# Patient Record
Sex: FEMALE | Race: WHITE | NOT HISPANIC OR LATINO | Employment: FULL TIME | URBAN - METROPOLITAN AREA
[De-identification: names, ages, dates, MRNs, and addresses within clinical notes are randomized per-mention and may not be internally consistent; named-entity substitution may affect disease eponyms.]

---

## 2024-01-24 LAB
HEPATITIS B SURFACE ANTIGEN (EXTERNAL): NEGATIVE
HIV1+2 AB SERPL QL IA: NEGATIVE
RUBELLA ANTIBODY IGG (EXTERNAL): NORMAL

## 2024-04-15 ENCOUNTER — HOSPITAL ENCOUNTER (EMERGENCY)
Facility: CLINIC | Age: 29
Discharge: HOME | End: 2024-04-15
Attending: EMERGENCY MEDICINE | Admitting: EMERGENCY MEDICINE
Payer: COMMERCIAL

## 2024-04-15 VITALS
DIASTOLIC BLOOD PRESSURE: 75 MMHG | TEMPERATURE: 97.9 F | RESPIRATION RATE: 20 BRPM | HEART RATE: 104 BPM | SYSTOLIC BLOOD PRESSURE: 136 MMHG | OXYGEN SATURATION: 97 %

## 2024-04-15 DIAGNOSIS — J10.1 INFLUENZA B: ICD-10-CM

## 2024-04-15 LAB
FLUAV RNA SPEC QL NAA+PROBE: NEGATIVE
FLUBV RNA RESP QL NAA+PROBE: POSITIVE
GROUP A STREP BY PCR: NOT DETECTED
RSV RNA SPEC NAA+PROBE: NEGATIVE
SARS-COV-2 RNA RESP QL NAA+PROBE: NEGATIVE

## 2024-04-15 PROCEDURE — 99283 EMERGENCY DEPT VISIT LOW MDM: CPT

## 2024-04-15 PROCEDURE — 87651 STREP A DNA AMP PROBE: CPT | Performed by: EMERGENCY MEDICINE

## 2024-04-15 PROCEDURE — 87637 SARSCOV2&INF A&B&RSV AMP PRB: CPT | Performed by: EMERGENCY MEDICINE

## 2024-04-15 RX ORDER — OSELTAMIVIR PHOSPHATE 75 MG/1
75 CAPSULE ORAL 2 TIMES DAILY
Qty: 10 CAPSULE | Refills: 0 | Status: SHIPPED | OUTPATIENT
Start: 2024-04-15 | End: 2024-04-20

## 2024-04-15 ASSESSMENT — ACTIVITIES OF DAILY LIVING (ADL)
ADLS_ACUITY_SCORE: 33
ADLS_ACUITY_SCORE: 33

## 2024-04-15 ASSESSMENT — COLUMBIA-SUICIDE SEVERITY RATING SCALE - C-SSRS
2. HAVE YOU ACTUALLY HAD ANY THOUGHTS OF KILLING YOURSELF IN THE PAST MONTH?: NO
1. IN THE PAST MONTH, HAVE YOU WISHED YOU WERE DEAD OR WISHED YOU COULD GO TO SLEEP AND NOT WAKE UP?: NO
6. HAVE YOU EVER DONE ANYTHING, STARTED TO DO ANYTHING, OR PREPARED TO DO ANYTHING TO END YOUR LIFE?: NO

## 2024-04-15 NOTE — ED TRIAGE NOTES
Sore throat, mild sob, generalized weakness. 6 months pregnant. Low grade fever that has resolved. Denies taking otc antipyretics. Denies any obstetric complaints.

## 2024-04-15 NOTE — DISCHARGE INSTRUCTIONS
Avoid all NSAIDS (naproxen, ibuprofen, aspirin, advil, motrin, aleve, etc.)  Avoid sudafed, phenylephrine, pseudoephedrine, afrin.     Ok to take claritin, flonase, and mucinex (make sure just mucinex) for symptom relief.   Tylenol ok to take 1,000mg every 6 hours as needed.     If ever any question/uncertainty you can ask your pharmacist or call your obgyn clinic.       Discharge Instructions  Influenza    You were diagnosed today with influenza or influenza like illness.  Influenza is a respiratory (breathing) illness caused by influenza A or B viruses.  Influenza causes five primary symptoms: fever, headache, muscle aches/fatigue/malaise, sore throat and cough.  These symptoms start one to four days after you have been around a person with this illness. Influenza is spread through sneezing and coughing and can live on surfaces for several days.  It is usually contagious for 5 days but in some cases up to 10 days and often affects several family members. If you have a family member who is less than 2 years old, older than 65 years old, pregnant or has a serious medical condition, they should be seen right away by a provider to decide if they should take preventative medications. Although influenza will make you feel very ill, most patients don t require any specific treatment. An antiviral medication might be prescribed for certain groups of patients (older patients, younger patients, and those with certain chronic medical problems).    Generally, every Emergency Department visit should have a follow-up clinic visit with either a primary or a specialty clinic/provider. Please follow-up as instructed by your emergency provider today.    Return to the Emergency Department if:  You have trouble breathing.  You develop pain in your chest.  You have signs of being dehydrated, such as dizziness or unable to urinate (pee) at least three times daily.  You are confused or severely weak.  You cannot stop vomiting (throwing  up) or you cannot drink enough fluids.    In children, you should seek help if the child has any of the above or if child:  Has blue or purplish skin color.  Is so irritable that he or she does not want to be held.  Does not have tears when crying (in infants) or does not urinate at least three times daily.  Does not wake up easily.    What can I do to help myself?  Rest.  Fluids -- Drink hydrating solutions such as Gatorade  or Pedialyte  as often as you can. If you are drinking enough, you should pass urine at least every eight hours.  Tylenol  (acetaminophen) and Advil  (ibuprofen) can relieve fever, headache, and muscle aches. Do not give aspirin to children under 18 years old.   Antiviral treatment -- Antiviral medicines do not make the flu symptoms go away immediately.  They have only been shown to make the symptoms go away 12 to 24 hours sooner than they would without treatment.     Antibiotics -- Antibiotics are NOT useful for treating viral illnesses such as influenza. Antibiotics should only be used if there is a bacterial complication of the flu such as bacterial pneumonia, ear infection, or sinusitis.  Because you were diagnosed with a flu like illness you are very contagious.  This means you cannot work, attend school or  for at least 24 hours or until you no longer have a fever.  If you were given a prescription for medicine here today, be sure to read all of the information (including the package insert) that comes with your prescription.  This will include important information about the medicine, its side effects, and any warnings that you need to know about.  The pharmacist who fills the prescription can provide more information and answer questions you may have about the medicine.  If you have questions or concerns that the pharmacist cannot address, please call or return to the Emergency Department.   Remember that you can always come back to the Emergency Department if you are not able to  see your regular provider in the amount of time listed above, if you get any new symptoms, or if there is anything that worries you.

## 2024-04-15 NOTE — LETTER
April 15, 2024      To Whom It May Concern:      Eleanor Blake was seen in our Emergency Department today, 04/15/24.  I expect her condition to improve over the next 5 days.  She may return to work/school when improved, in the meantime she may work from home.     Sincerely,        Catherine OLIVARES

## 2024-04-15 NOTE — ED PROVIDER NOTES
History     Chief Complaint:  Flu Symptoms      HPI   Eleanor Blake is a 28 year old female, , currently 29 weeks pregnant who presents to the ED with flu symptoms. Patient states that she developed hot and cold flashes three days ago. Maximum fever was 100.1 F. She has also been experiencing rhinorrhea, a severe sore throat, and a cough. She expresses concerns that her cough is putting pressure on her baby. She is still able to feel her baby move. Also endorses dizziness. Eleanor adds that her ears have been popping, though they are not painful. She took 1 Naproxen for her symptoms prior to arrival today.     Independent Historian:   None - Patient Only    Review of External Notes:    none    Medications:    Lexapro   Atarax   Vistaril   Prilosec     Past Medical History:    No medical history on record.     Physical Exam   Patient Vitals for the past 24 hrs:   BP Temp Pulse Resp SpO2   04/15/24 1122 136/75 97.9  F (36.6  C) 104 20 97 %        Physical Exam    Nursing note and vitals reviewed.  HENT:   Mouth/Throat: Moist mucous membranes.   TMs normal bilaterally.   Eyes: EOMI, nonicteric sclera  Cardiovascular: Normal rate, regular rhythm, no murmurs, rubs, or gallops  Pulmonary/Chest: Effort normal and breath sounds normal. No respiratory distress. No wheezes. No rales.   Abdominal: Soft. Nontender, nondistended, no guarding or rigidity.   Musculoskeletal: Normal range of motion.   Neurological: Alert. Moves all extremities spontaneously.   Skin: Skin is warm and dry. No rash noted.       Emergency Department Course     Laboratory:  Labs Ordered and Resulted from Time of ED Arrival to Time of ED Departure   INFLUENZA A/B, RSV, & SARS-COV2 PCR - Abnormal       Result Value    Influenza A PCR Negative      Influenza B PCR Positive (*)     RSV PCR Negative      SARS CoV2 PCR Negative     GROUP A STREPTOCOCCUS PCR THROAT SWAB - Normal    Group A strep by PCR Not Detected         Emergency Department Course &  Assessments:    Assessments:  1302 I obtained history and examined the patient as noted above. Also discussed findings and plan for discharge with patient, who is agreeable.     Social Determinants of Health affecting care:   None    Disposition:  The patient was discharged.     Impression & Plan    CMS Diagnoses: None       Medical Decision Making:  Eleanor Blake is a 28 year old female who presents for evaluation of cough, fever and myalgias. She is currently pregnant.   This is consistent with influenza and PCR testing positive. While she is out of typical treatment window, the fact that she is pregnant makes tamiflu indicated. We discussed effectiveness and side effects. They are at risk for pneumonia but no signs of this are detected on today's visit.  Close followup with obgyn is indicated and return to the ED for increasing productive cough, shortness of breath, or confusion.  There is no signs of serious bacterial infection such as bacteremia, meningitis, UTI/pyelonephritis, strep pharyngitis, etc.  FHT were checked and normal. We discussed supportive medications that are safe in pregnancy, and we also discussed medications she should avoid. She is in stable condition at the time of discharge, red flags that should merit ED return were discussed as well as recommended follow-up instructions. All questions were answered and she is in agreement with the plan.          Diagnosis:    ICD-10-CM    1. Influenza B  J10.1            Discharge Medications:  Discharge Medication List as of 4/15/2024  1:12 PM        START taking these medications    Details   oseltamivir (TAMIFLU) 75 MG capsule Take 1 capsule (75 mg) by mouth 2 times daily for 5 days, Disp-10 capsule, R-0, E-Prescribe                Scribe Disclosure:  JARRED, Rose Valle, am serving as a scribe at 1:02 PM on 4/15/2024 to document services personally performed by Kole Root MD based on my observations and the provider's statements to me.           Kole Root MD  04/17/24 7920

## 2024-07-03 LAB — GROUP B STREPTOCOCCUS (EXTERNAL): NEGATIVE

## 2024-07-21 ENCOUNTER — HOSPITAL ENCOUNTER (OUTPATIENT)
Facility: CLINIC | Age: 29
Discharge: HOME | End: 2024-07-21
Attending: OBSTETRICS & GYNECOLOGY | Admitting: OBSTETRICS & GYNECOLOGY
Payer: COMMERCIAL

## 2024-07-21 VITALS
WEIGHT: 224 LBS | SYSTOLIC BLOOD PRESSURE: 106 MMHG | HEIGHT: 65 IN | DIASTOLIC BLOOD PRESSURE: 70 MMHG | TEMPERATURE: 97.9 F | BODY MASS INDEX: 37.32 KG/M2 | RESPIRATION RATE: 18 BRPM

## 2024-07-21 PROBLEM — Z36.89 ENCOUNTER FOR TRIAGE IN PREGNANT PATIENT: Status: ACTIVE | Noted: 2024-07-21

## 2024-07-21 LAB
ALBUMIN UR-MCNC: NEGATIVE MG/DL
AMORPH CRY #/AREA URNS HPF: ABNORMAL /HPF
APPEARANCE UR: ABNORMAL
BILIRUB UR QL STRIP: NEGATIVE
COLOR UR AUTO: YELLOW
GLUCOSE UR STRIP-MCNC: NEGATIVE MG/DL
HGB UR QL STRIP: NEGATIVE
HYALINE CASTS: 1 /LPF
KETONES UR STRIP-MCNC: NEGATIVE MG/DL
LEUKOCYTE ESTERASE UR QL STRIP: NEGATIVE
MUCOUS THREADS #/AREA URNS LPF: PRESENT /LPF
NITRATE UR QL: NEGATIVE
PH UR STRIP: 6.5 [PH] (ref 5–7)
RBC URINE: 1 /HPF
SP GR UR STRIP: 1.02 (ref 1–1.03)
SQUAMOUS EPITHELIAL: <1 /HPF
UROBILINOGEN UR STRIP-MCNC: 2 MG/DL
WBC URINE: 2 /HPF

## 2024-07-21 PROCEDURE — 59025 FETAL NON-STRESS TEST: CPT

## 2024-07-21 PROCEDURE — 81001 URINALYSIS AUTO W/SCOPE: CPT | Performed by: OBSTETRICS & GYNECOLOGY

## 2024-07-21 PROCEDURE — G0463 HOSPITAL OUTPT CLINIC VISIT: HCPCS | Mod: 25

## 2024-07-21 RX ORDER — LIDOCAINE 40 MG/G
CREAM TOPICAL
Status: DISCONTINUED | OUTPATIENT
Start: 2024-07-21 | End: 2024-07-21 | Stop reason: HOSPADM

## 2024-07-21 RX ORDER — ESCITALOPRAM OXALATE 20 MG/1
20 TABLET ORAL DAILY
COMMUNITY

## 2024-07-21 ASSESSMENT — ACTIVITIES OF DAILY LIVING (ADL)
ADLS_ACUITY_SCORE: 20
ADLS_ACUITY_SCORE: 20
CONCENTRATING,_REMEMBERING_OR_MAKING_DECISIONS_DIFFICULTY: NO
WEAR_GLASSES_OR_BLIND: YES
FALL_HISTORY_WITHIN_LAST_SIX_MONTHS: NO

## 2024-07-22 NOTE — DISCHARGE INSTRUCTIONS
Learning About When to Call Your Doctor During Pregnancy (After 20 Weeks)  Overview  It's common to have concerns about what might be a problem when you're pregnant. Most pregnancies don't have any serious problems. But it's still important to know when to call your doctor if you have certain symptoms or signs of labor.  These are general suggestions. Your doctor may give you some more information about when to call.  When to call your doctor (after 20 weeks)  Call 911  anytime you think you may need emergency care. For example, call if:  You have severe vaginal bleeding. This means you are soaking through a pad each hour for 2 or more hours.  You have sudden, severe pain in your belly.  You have chest pain, are short of breath, or cough up blood.  You passed out (lost consciousness).  You have a seizure.  You see or feel the umbilical cord.  You think you are about to deliver your baby and can't make it safely to the hospital or birthing center.  Call your doctor now or seek immediate medical care if:  You have vaginal bleeding.  You have belly pain.  You have a fever.  You are dizzy or lightheaded, or you feel like you may faint.  You have signs of a blood clot in your leg (called a deep vein thrombosis), such as:  Pain in the calf, back of the knee, thigh, or groin.  Swelling in your leg or groin.  A color change on the leg or groin. The skin may be reddish or purplish, depending on your usual skin color.  You have symptoms of preeclampsia, such as:  Sudden swelling of your face, hands, or feet.  New vision problems (such as dimness, blurring, or seeing spots).  A severe headache.  You have a sudden release of fluid from your vagina. (You think your water broke.)  You've been having regular contractions for an hour. This means that you've had at least 6 contractions within 1 hour, even after you change your position and drink fluids.  You notice that your baby has stopped moving or is moving less than  "normal.  You have signs of heart failure, such as:  New or increased shortness of breath.  New or worse swelling in your legs, ankles, or feet.  Sudden weight gain, such as more than 2 to 3 pounds in a day or 5 pounds in a week.  Feeling so tired or weak that you cannot do your usual activities.  You have symptoms of a urinary tract infection. These may include:  Pain or burning when you urinate.  A frequent need to urinate without being able to pass much urine.  Pain in the flank, which is just below the rib cage and above the waist on either side of the back.  Blood in your urine.  Watch closely for changes in your health, and be sure to contact your doctor if:  You have vaginal discharge that smells bad.  You feel sad, anxious, or hopeless for more than a few days.  You have skin changes, such as a rash, itching, or a yellow color to your skin.  You have other concerns about your pregnancy.  If you have labor signs at 37 weeks or more  If you have signs of labor at 37 weeks or more, your doctor may tell you to call when your labor becomes more active. Symptoms of active labor include:  Contractions that are regular.  Contractions that are less than 5 minutes apart.  Contractions that are hard to talk through.  Follow-up care is a key part of your treatment and safety. Be sure to make and go to all appointments, and call your doctor if you are having problems. It's also a good idea to know your test results and keep a list of the medicines you take.  Where can you learn more?  Go to [email or web address].net/patiented  Enter N531 in the search box to learn more about \"Learning About When to Call Your Doctor During Pregnancy (After 20 Weeks).\"  Current as of: July 10, 2023               Content Version: 14.0    6639-4259 [employer].   Care instructions adapted under license by your healthcare professional. If you have questions about a medical condition or this instruction, always ask your healthcare " professional. [employer] disclaims any warranty or liability for your use of this information.

## 2024-07-22 NOTE — PROGRESS NOTES
Data: Patient assessed in the [facility] for uterine contractions, vaginal bleeding.  Cervical exam closed.  Membranes intact.  Contractions/uterine assessment completed.  Action:  Presumed adequate fetal oxygenation documented (see flow record). Discharge instructions reviewed.  Patient instructed to report change in fetal movement, vaginal leaking of fluid or bleeding, abdominal pain, or any concerns related to the pregnancy to her nurse/physician.    Response: Orders to discharge home per Sarmad Uribe.  Patient verbalized understanding of education and verbalized agreement with plan. Discharged to home at 2136.

## 2024-07-22 NOTE — CARE PLAN
Data: Patient presented to [facility]: 2024  7:49 PM.  Reason for maternal/fetal assessment is uterine contractions, vaginal bleeding. Patient reports bleeding occurred around 1900 while at the movie theater she noticed some cramping and then went to the bathroom and noticed some bright red blood on toilet paper. Since then she has felt mildly crampy but has not noticed more blood.  Patient is a .  Prenatal record reviewed. Pregnancy  has been complicated by abnormal nips requiring amniocentesis which came back WDL.  Gestational Age 38w4d. VSS. Fetal movement active. Patient denies pelvic pressure, nausea, vomiting, headache, visual disturbances, epigastric or URQ pain, significant edema. Support person is present.   Action: Verbal consent for EFM. Triage assessment completed. Bill of rights reviewed.  Response: Patient verbalized agreement with plan. Will contact Dr Sarmad Uribe with update and for further orders.

## 2024-07-22 NOTE — PROVIDER NOTIFICATION
"   24   Provider Notification   Provider Name/Title Dr Uribe   Method of Notification Phone   Request Evaluate - Remote   Notification Reason Status Update     Updated MD of pt here for VB and rule out labor. Pt is a  at 38.4 with health hx notable for anxiety and abnormal NIPS with WDL amniocentesis. Pt reports cramping occurring while at the movies earlier this evening and then used the bathroom and noted bright red blood on toilet paper. Since then she has felt mildly crampy and has not noticed more blood. Pt reports that she has felt \"moist\" but that she has noticed increase in discharge. SVE performed and external os 1cm and internal os funnels to close, thick, medium consistency and slightly posterior. No VB noted on glove and no LOF noted on labia,chucks etc after pooling for approx 1hr. Fetal tracing initially minimal to moderate variability with accelerations and unclassifiable decrease around . Fetal tracing now Cat 1. [place] replaced due to showing high resting tone, and now no cx noted. Abdomen soft and non tender to palpation. Orders to charge for NST, and UA- if WDL pt ok to disharge to home with out follow up call to MD.   "

## 2024-07-25 ENCOUNTER — ANESTHESIA (OUTPATIENT)
Dept: OBGYN | Facility: CLINIC | Age: 29
End: 2024-07-25
Payer: COMMERCIAL

## 2024-07-25 ENCOUNTER — ANESTHESIA EVENT (OUTPATIENT)
Dept: OBGYN | Facility: CLINIC | Age: 29
End: 2024-07-25
Payer: COMMERCIAL

## 2024-07-25 ENCOUNTER — HOSPITAL ENCOUNTER (INPATIENT)
Facility: CLINIC | Age: 29
LOS: 2 days | Discharge: HOME HEALTH | End: 2024-07-28
Attending: OBSTETRICS & GYNECOLOGY | Admitting: OBSTETRICS & GYNECOLOGY
Payer: COMMERCIAL

## 2024-07-25 DIAGNOSIS — Z98.891 S/P CESAREAN SECTION: Primary | ICD-10-CM

## 2024-07-25 LAB
ABO/RH(D): NORMAL
ANTIBODY SCREEN: NEGATIVE
ERYTHROCYTE [DISTWIDTH] IN BLOOD BY AUTOMATED COUNT: 14.1 % (ref 10–15)
FLUAV RNA SPEC QL NAA+PROBE: NEGATIVE
FLUBV RNA RESP QL NAA+PROBE: NEGATIVE
HCT VFR BLD AUTO: 37.4 % (ref 35–47)
HGB BLD-MCNC: 12.1 G/DL (ref 11.7–15.7)
MCH RBC QN AUTO: 28.1 PG (ref 26.5–33)
MCHC RBC AUTO-ENTMCNC: 32.4 G/DL (ref 31.5–36.5)
MCV RBC AUTO: 87 FL (ref 78–100)
PLATELET # BLD AUTO: 317 10E3/UL (ref 150–450)
RBC # BLD AUTO: 4.3 10E6/UL (ref 3.8–5.2)
RSV RNA SPEC NAA+PROBE: NEGATIVE
SARS-COV-2 RNA RESP QL NAA+PROBE: NEGATIVE
SPECIMEN EXPIRATION DATE: NORMAL
WBC # BLD AUTO: 14.7 10E3/UL (ref 4–11)

## 2024-07-25 PROCEDURE — G0463 HOSPITAL OUTPT CLINIC VISIT: HCPCS

## 2024-07-25 PROCEDURE — 710N000009 HC RECOVERY PHASE 1, LEVEL 1, PER MIN: Performed by: OBSTETRICS & GYNECOLOGY

## 2024-07-25 PROCEDURE — 370N000017 HC ANESTHESIA TECHNICAL FEE, PER MIN: Performed by: OBSTETRICS & GYNECOLOGY

## 2024-07-25 PROCEDURE — 250N000011 HC RX IP 250 OP 636: Performed by: ANESTHESIOLOGY

## 2024-07-25 PROCEDURE — 258N000003 HC RX IP 258 OP 636: Performed by: OBSTETRICS & GYNECOLOGY

## 2024-07-25 PROCEDURE — 87637 SARSCOV2&INF A&B&RSV AMP PRB: CPT | Performed by: OBSTETRICS & GYNECOLOGY

## 2024-07-25 PROCEDURE — 0W3R0ZZ CONTROL BLEEDING IN GENITOURINARY TRACT, OPEN APPROACH: ICD-10-PCS | Performed by: OBSTETRICS & GYNECOLOGY

## 2024-07-25 PROCEDURE — 250N000009 HC RX 250: Performed by: NURSE ANESTHETIST, CERTIFIED REGISTERED

## 2024-07-25 PROCEDURE — 360N000076 HC SURGERY LEVEL 3, PER MIN: Performed by: OBSTETRICS & GYNECOLOGY

## 2024-07-25 PROCEDURE — 86900 BLOOD TYPING SEROLOGIC ABO: CPT | Performed by: OBSTETRICS & GYNECOLOGY

## 2024-07-25 PROCEDURE — 258N000003 HC RX IP 258 OP 636: Performed by: NURSE ANESTHETIST, CERTIFIED REGISTERED

## 2024-07-25 PROCEDURE — 250N000011 HC RX IP 250 OP 636: Performed by: NURSE ANESTHETIST, CERTIFIED REGISTERED

## 2024-07-25 PROCEDURE — 85027 COMPLETE CBC AUTOMATED: CPT | Performed by: OBSTETRICS & GYNECOLOGY

## 2024-07-25 PROCEDURE — 86780 TREPONEMA PALLIDUM: CPT | Performed by: OBSTETRICS & GYNECOLOGY

## 2024-07-25 PROCEDURE — 250N000013 HC RX MED GY IP 250 OP 250 PS 637: Performed by: OBSTETRICS & GYNECOLOGY

## 2024-07-25 PROCEDURE — 250N000011 HC RX IP 250 OP 636: Performed by: OBSTETRICS & GYNECOLOGY

## 2024-07-25 PROCEDURE — 272N000001 HC OR GENERAL SUPPLY STERILE: Performed by: OBSTETRICS & GYNECOLOGY

## 2024-07-25 RX ORDER — CEFAZOLIN SODIUM/WATER 2 G/20 ML
2 SYRINGE (ML) INTRAVENOUS SEE ADMIN INSTRUCTIONS
Status: DISCONTINUED | OUTPATIENT
Start: 2024-07-25 | End: 2024-07-25 | Stop reason: HOSPADM

## 2024-07-25 RX ORDER — AZITHROMYCIN 500 MG/5ML
INJECTION, POWDER, LYOPHILIZED, FOR SOLUTION INTRAVENOUS
Status: COMPLETED
Start: 2024-07-25 | End: 2024-07-25

## 2024-07-25 RX ORDER — CARBOPROST TROMETHAMINE 250 UG/ML
250 INJECTION, SOLUTION INTRAMUSCULAR
Status: DISCONTINUED | OUTPATIENT
Start: 2024-07-25 | End: 2024-07-25 | Stop reason: HOSPADM

## 2024-07-25 RX ORDER — DEXAMETHASONE SODIUM PHOSPHATE 4 MG/ML
INJECTION, SOLUTION INTRA-ARTICULAR; INTRALESIONAL; INTRAMUSCULAR; INTRAVENOUS; SOFT TISSUE PRN
Status: DISCONTINUED | OUTPATIENT
Start: 2024-07-25 | End: 2024-07-25

## 2024-07-25 RX ORDER — NALBUPHINE HYDROCHLORIDE 10 MG/ML
2.5-5 INJECTION, SOLUTION INTRAMUSCULAR; INTRAVENOUS; SUBCUTANEOUS EVERY 6 HOURS PRN
Status: DISCONTINUED | OUTPATIENT
Start: 2024-07-25 | End: 2024-07-28 | Stop reason: HOSPADM

## 2024-07-25 RX ORDER — OXYTOCIN/0.9 % SODIUM CHLORIDE 30/500 ML
PLASTIC BAG, INJECTION (ML) INTRAVENOUS PRN
Status: DISCONTINUED | OUTPATIENT
Start: 2024-07-25 | End: 2024-07-25

## 2024-07-25 RX ORDER — ACETAMINOPHEN 325 MG/1
975 TABLET ORAL ONCE
Status: COMPLETED | OUTPATIENT
Start: 2024-07-25 | End: 2024-07-25

## 2024-07-25 RX ORDER — KETOROLAC TROMETHAMINE 30 MG/ML
INJECTION, SOLUTION INTRAMUSCULAR; INTRAVENOUS PRN
Status: DISCONTINUED | OUTPATIENT
Start: 2024-07-25 | End: 2024-07-25

## 2024-07-25 RX ORDER — LOPERAMIDE HCL 2 MG
4 CAPSULE ORAL
Status: DISCONTINUED | OUTPATIENT
Start: 2024-07-25 | End: 2024-07-25 | Stop reason: HOSPADM

## 2024-07-25 RX ORDER — SODIUM CHLORIDE, SODIUM LACTATE, POTASSIUM CHLORIDE, CALCIUM CHLORIDE 600; 310; 30; 20 MG/100ML; MG/100ML; MG/100ML; MG/100ML
INJECTION, SOLUTION INTRAVENOUS CONTINUOUS
Status: DISCONTINUED | OUTPATIENT
Start: 2024-07-25 | End: 2024-07-25 | Stop reason: HOSPADM

## 2024-07-25 RX ORDER — METHYLERGONOVINE MALEATE 0.2 MG/ML
200 INJECTION INTRAVENOUS
Status: DISCONTINUED | OUTPATIENT
Start: 2024-07-25 | End: 2024-07-25 | Stop reason: HOSPADM

## 2024-07-25 RX ORDER — FENTANYL CITRATE 50 UG/ML
INJECTION, SOLUTION INTRAMUSCULAR; INTRAVENOUS
Status: COMPLETED | OUTPATIENT
Start: 2024-07-25 | End: 2024-07-25

## 2024-07-25 RX ORDER — OXYTOCIN/0.9 % SODIUM CHLORIDE 30/500 ML
340 PLASTIC BAG, INJECTION (ML) INTRAVENOUS CONTINUOUS PRN
Status: DISCONTINUED | OUTPATIENT
Start: 2024-07-25 | End: 2024-07-25 | Stop reason: HOSPADM

## 2024-07-25 RX ORDER — ASPIRIN 81 MG/1
1 TABLET ORAL DAILY
Status: ON HOLD | COMMUNITY
End: 2024-07-27

## 2024-07-25 RX ORDER — OXYTOCIN 10 [USP'U]/ML
10 INJECTION, SOLUTION INTRAMUSCULAR; INTRAVENOUS
Status: DISCONTINUED | OUTPATIENT
Start: 2024-07-25 | End: 2024-07-25 | Stop reason: HOSPADM

## 2024-07-25 RX ORDER — CEFAZOLIN SODIUM/WATER 2 G/20 ML
SYRINGE (ML) INTRAVENOUS
Status: COMPLETED
Start: 2024-07-25 | End: 2024-07-25

## 2024-07-25 RX ORDER — MORPHINE SULFATE 1 MG/ML
INJECTION, SOLUTION EPIDURAL; INTRATHECAL; INTRAVENOUS
Status: COMPLETED | OUTPATIENT
Start: 2024-07-25 | End: 2024-07-25

## 2024-07-25 RX ORDER — CITRIC ACID/SODIUM CITRATE 334-500MG
30 SOLUTION, ORAL ORAL
Status: COMPLETED | OUTPATIENT
Start: 2024-07-25 | End: 2024-07-25

## 2024-07-25 RX ORDER — CITRIC ACID/SODIUM CITRATE 334-500MG
30 SOLUTION, ORAL ORAL ONCE
Status: DISCONTINUED | OUTPATIENT
Start: 2024-07-25 | End: 2024-07-26 | Stop reason: HOSPADM

## 2024-07-25 RX ORDER — EPHEDRINE SULFATE 50 MG/ML
5 INJECTION, SOLUTION INTRAMUSCULAR; INTRAVENOUS; SUBCUTANEOUS
Status: DISCONTINUED | OUTPATIENT
Start: 2024-07-25 | End: 2024-07-26 | Stop reason: HOSPADM

## 2024-07-25 RX ORDER — BUPIVACAINE HYDROCHLORIDE 7.5 MG/ML
INJECTION, SOLUTION INTRASPINAL
Status: COMPLETED | OUTPATIENT
Start: 2024-07-25 | End: 2024-07-25

## 2024-07-25 RX ORDER — LIDOCAINE 40 MG/G
CREAM TOPICAL
Status: DISCONTINUED | OUTPATIENT
Start: 2024-07-25 | End: 2024-07-25 | Stop reason: HOSPADM

## 2024-07-25 RX ORDER — TRANEXAMIC ACID 10 MG/ML
1 INJECTION, SOLUTION INTRAVENOUS EVERY 30 MIN PRN
Status: DISCONTINUED | OUTPATIENT
Start: 2024-07-25 | End: 2024-07-25 | Stop reason: HOSPADM

## 2024-07-25 RX ORDER — LIDOCAINE 40 MG/G
CREAM TOPICAL
Status: DISCONTINUED | OUTPATIENT
Start: 2024-07-25 | End: 2024-07-26 | Stop reason: HOSPADM

## 2024-07-25 RX ORDER — ONDANSETRON 4 MG/1
4 TABLET, ORALLY DISINTEGRATING ORAL EVERY 6 HOURS PRN
Status: DISCONTINUED | OUTPATIENT
Start: 2024-07-25 | End: 2024-07-26 | Stop reason: HOSPADM

## 2024-07-25 RX ORDER — LOPERAMIDE HCL 2 MG
2 CAPSULE ORAL
Status: DISCONTINUED | OUTPATIENT
Start: 2024-07-25 | End: 2024-07-25 | Stop reason: HOSPADM

## 2024-07-25 RX ORDER — SODIUM CHLORIDE, SODIUM LACTATE, POTASSIUM CHLORIDE, CALCIUM CHLORIDE 600; 310; 30; 20 MG/100ML; MG/100ML; MG/100ML; MG/100ML
INJECTION, SOLUTION INTRAVENOUS CONTINUOUS PRN
Status: DISCONTINUED | OUTPATIENT
Start: 2024-07-25 | End: 2024-07-25

## 2024-07-25 RX ORDER — ONDANSETRON 2 MG/ML
4 INJECTION INTRAMUSCULAR; INTRAVENOUS EVERY 6 HOURS PRN
Status: DISCONTINUED | OUTPATIENT
Start: 2024-07-25 | End: 2024-07-26 | Stop reason: HOSPADM

## 2024-07-25 RX ORDER — CEFAZOLIN SODIUM/WATER 2 G/20 ML
2 SYRINGE (ML) INTRAVENOUS
Status: COMPLETED | OUTPATIENT
Start: 2024-07-25 | End: 2024-07-25

## 2024-07-25 RX ORDER — MISOPROSTOL 200 UG/1
400 TABLET ORAL
Status: DISCONTINUED | OUTPATIENT
Start: 2024-07-25 | End: 2024-07-25 | Stop reason: HOSPADM

## 2024-07-25 RX ORDER — MISOPROSTOL 200 UG/1
800 TABLET ORAL
Status: DISCONTINUED | OUTPATIENT
Start: 2024-07-25 | End: 2024-07-25 | Stop reason: HOSPADM

## 2024-07-25 RX ORDER — ONDANSETRON 2 MG/ML
INJECTION INTRAMUSCULAR; INTRAVENOUS PRN
Status: DISCONTINUED | OUTPATIENT
Start: 2024-07-25 | End: 2024-07-25

## 2024-07-25 RX ADMIN — PHENYLEPHRINE HYDROCHLORIDE 0.4 MCG/KG/MIN: 10 INJECTION INTRAVENOUS at 20:20

## 2024-07-25 RX ADMIN — FENTANYL CITRATE 15 MCG: 50 INJECTION INTRAMUSCULAR; INTRAVENOUS at 20:24

## 2024-07-25 RX ADMIN — MORPHINE SULFATE 0.15 MG: 1 INJECTION EPIDURAL; INTRATHECAL; INTRAVENOUS at 20:24

## 2024-07-25 RX ADMIN — OXYTOCIN-SODIUM CHLORIDE 0.9% IV SOLN 30 UNIT/500ML 290 ML: 30-0.9/5 SOLUTION at 21:13

## 2024-07-25 RX ADMIN — ONDANSETRON 4 MG: 2 INJECTION INTRAMUSCULAR; INTRAVENOUS at 20:27

## 2024-07-25 RX ADMIN — BUPIVACAINE HYDROCHLORIDE IN DEXTROSE 1.6 ML: 7.5 INJECTION, SOLUTION SUBARACHNOID at 20:24

## 2024-07-25 RX ADMIN — ACETAMINOPHEN 975 MG: 325 TABLET, FILM COATED ORAL at 20:15

## 2024-07-25 RX ADMIN — SODIUM CHLORIDE, POTASSIUM CHLORIDE, SODIUM LACTATE AND CALCIUM CHLORIDE: 600; 310; 30; 20 INJECTION, SOLUTION INTRAVENOUS at 20:34

## 2024-07-25 RX ADMIN — NALBUPHINE HYDROCHLORIDE 2.5 MG: 10 INJECTION, SOLUTION INTRAMUSCULAR; INTRAVENOUS; SUBCUTANEOUS at 22:28

## 2024-07-25 RX ADMIN — SODIUM CHLORIDE, POTASSIUM CHLORIDE, SODIUM LACTATE AND CALCIUM CHLORIDE 1000 ML: 600; 310; 30; 20 INJECTION, SOLUTION INTRAVENOUS at 19:38

## 2024-07-25 RX ADMIN — SODIUM CHLORIDE, POTASSIUM CHLORIDE, SODIUM LACTATE AND CALCIUM CHLORIDE: 600; 310; 30; 20 INJECTION, SOLUTION INTRAVENOUS at 20:15

## 2024-07-25 RX ADMIN — SODIUM CITRATE AND CITRIC ACID MONOHYDRATE 30 ML: 500; 334 SOLUTION ORAL at 20:15

## 2024-07-25 RX ADMIN — OXYTOCIN-SODIUM CHLORIDE 0.9% IV SOLN 30 UNIT/500ML 10 ML: 30-0.9/5 SOLUTION at 20:41

## 2024-07-25 RX ADMIN — KETOROLAC TROMETHAMINE 30 MG: 30 INJECTION, SOLUTION INTRAMUSCULAR at 21:17

## 2024-07-25 RX ADMIN — DEXAMETHASONE SODIUM PHOSPHATE 4 MG: 4 INJECTION, SOLUTION INTRA-ARTICULAR; INTRALESIONAL; INTRAMUSCULAR; INTRAVENOUS; SOFT TISSUE at 20:27

## 2024-07-25 RX ADMIN — Medication 2 G: at 20:21

## 2024-07-25 RX ADMIN — TRANEXAMIC ACID 1 G: 1 INJECTION, SOLUTION INTRAVENOUS at 21:00

## 2024-07-25 ASSESSMENT — ACTIVITIES OF DAILY LIVING (ADL)
ADLS_ACUITY_SCORE: 35
ADLS_ACUITY_SCORE: 35
ADLS_ACUITY_SCORE: 21
ADLS_ACUITY_SCORE: 20
ADLS_ACUITY_SCORE: 35

## 2024-07-26 LAB
CREAT SERPL-MCNC: 0.58 MG/DL (ref 0.51–0.95)
EGFRCR SERPLBLD CKD-EPI 2021: >90 ML/MIN/1.73M2
HGB BLD-MCNC: 10.3 G/DL (ref 11.7–15.7)
T PALLIDUM AB SER QL: NONREACTIVE

## 2024-07-26 PROCEDURE — 250N000013 HC RX MED GY IP 250 OP 250 PS 637: Performed by: OBSTETRICS & GYNECOLOGY

## 2024-07-26 PROCEDURE — 36415 COLL VENOUS BLD VENIPUNCTURE: CPT | Performed by: OBSTETRICS & GYNECOLOGY

## 2024-07-26 PROCEDURE — 999N000080 HC STATISTIC IP LACTATION SERVICES 16-30 MIN

## 2024-07-26 PROCEDURE — 82565 ASSAY OF CREATININE: CPT | Performed by: OBSTETRICS & GYNECOLOGY

## 2024-07-26 PROCEDURE — 120N000013 HC R&B IMCU

## 2024-07-26 PROCEDURE — 85018 HEMOGLOBIN: CPT | Performed by: OBSTETRICS & GYNECOLOGY

## 2024-07-26 PROCEDURE — 250N000011 HC RX IP 250 OP 636: Performed by: ANESTHESIOLOGY

## 2024-07-26 PROCEDURE — 250N000011 HC RX IP 250 OP 636: Performed by: OBSTETRICS & GYNECOLOGY

## 2024-07-26 RX ORDER — DEXTROSE, SODIUM CHLORIDE, SODIUM LACTATE, POTASSIUM CHLORIDE, AND CALCIUM CHLORIDE 5; .6; .31; .03; .02 G/100ML; G/100ML; G/100ML; G/100ML; G/100ML
INJECTION, SOLUTION INTRAVENOUS CONTINUOUS
Status: DISCONTINUED | OUTPATIENT
Start: 2024-07-26 | End: 2024-07-28 | Stop reason: HOSPADM

## 2024-07-26 RX ORDER — NALOXONE HYDROCHLORIDE 0.4 MG/ML
0.4 INJECTION, SOLUTION INTRAMUSCULAR; INTRAVENOUS; SUBCUTANEOUS
Status: DISCONTINUED | OUTPATIENT
Start: 2024-07-26 | End: 2024-07-28 | Stop reason: HOSPADM

## 2024-07-26 RX ORDER — OXYTOCIN 10 [USP'U]/ML
10 INJECTION, SOLUTION INTRAMUSCULAR; INTRAVENOUS
Status: DISCONTINUED | OUTPATIENT
Start: 2024-07-26 | End: 2024-07-28 | Stop reason: HOSPADM

## 2024-07-26 RX ORDER — MISOPROSTOL 200 UG/1
800 TABLET ORAL
Status: DISCONTINUED | OUTPATIENT
Start: 2024-07-26 | End: 2024-07-28 | Stop reason: HOSPADM

## 2024-07-26 RX ORDER — OXYCODONE HYDROCHLORIDE 5 MG/1
5 TABLET ORAL EVERY 4 HOURS PRN
Status: DISCONTINUED | OUTPATIENT
Start: 2024-07-26 | End: 2024-07-28 | Stop reason: HOSPADM

## 2024-07-26 RX ORDER — IBUPROFEN 800 MG/1
800 TABLET, FILM COATED ORAL EVERY 6 HOURS
Status: DISCONTINUED | OUTPATIENT
Start: 2024-07-26 | End: 2024-07-28 | Stop reason: HOSPADM

## 2024-07-26 RX ORDER — KETOROLAC TROMETHAMINE 30 MG/ML
30 INJECTION, SOLUTION INTRAMUSCULAR; INTRAVENOUS EVERY 6 HOURS
Status: COMPLETED | OUTPATIENT
Start: 2024-07-26 | End: 2024-07-26

## 2024-07-26 RX ORDER — METHYLERGONOVINE MALEATE 0.2 MG/ML
200 INJECTION INTRAVENOUS
Status: DISCONTINUED | OUTPATIENT
Start: 2024-07-26 | End: 2024-07-28 | Stop reason: HOSPADM

## 2024-07-26 RX ORDER — METOCLOPRAMIDE HYDROCHLORIDE 5 MG/ML
10 INJECTION INTRAMUSCULAR; INTRAVENOUS EVERY 6 HOURS PRN
Status: DISCONTINUED | OUTPATIENT
Start: 2024-07-26 | End: 2024-07-28 | Stop reason: HOSPADM

## 2024-07-26 RX ORDER — ESCITALOPRAM OXALATE 20 MG/1
20 TABLET ORAL AT BEDTIME
Status: DISCONTINUED | OUTPATIENT
Start: 2024-07-26 | End: 2024-07-28 | Stop reason: HOSPADM

## 2024-07-26 RX ORDER — CARBOPROST TROMETHAMINE 250 UG/ML
250 INJECTION, SOLUTION INTRAMUSCULAR
Status: DISCONTINUED | OUTPATIENT
Start: 2024-07-26 | End: 2024-07-28 | Stop reason: HOSPADM

## 2024-07-26 RX ORDER — IBUPROFEN 800 MG/1
800 TABLET, FILM COATED ORAL EVERY 6 HOURS
Status: DISCONTINUED | OUTPATIENT
Start: 2024-07-27 | End: 2024-07-26

## 2024-07-26 RX ORDER — NALOXONE HYDROCHLORIDE 0.4 MG/ML
0.2 INJECTION, SOLUTION INTRAMUSCULAR; INTRAVENOUS; SUBCUTANEOUS
Status: DISCONTINUED | OUTPATIENT
Start: 2024-07-26 | End: 2024-07-28 | Stop reason: HOSPADM

## 2024-07-26 RX ORDER — MODIFIED LANOLIN
OINTMENT (GRAM) TOPICAL
Status: DISCONTINUED | OUTPATIENT
Start: 2024-07-26 | End: 2024-07-28 | Stop reason: HOSPADM

## 2024-07-26 RX ORDER — SIMETHICONE 80 MG
80 TABLET,CHEWABLE ORAL 4 TIMES DAILY PRN
Status: DISCONTINUED | OUTPATIENT
Start: 2024-07-26 | End: 2024-07-28 | Stop reason: HOSPADM

## 2024-07-26 RX ORDER — OXYTOCIN/0.9 % SODIUM CHLORIDE 30/500 ML
100-340 PLASTIC BAG, INJECTION (ML) INTRAVENOUS CONTINUOUS PRN
Status: DISCONTINUED | OUTPATIENT
Start: 2024-07-26 | End: 2024-07-28 | Stop reason: HOSPADM

## 2024-07-26 RX ORDER — METOCLOPRAMIDE 10 MG/1
10 TABLET ORAL EVERY 6 HOURS PRN
Status: DISCONTINUED | OUTPATIENT
Start: 2024-07-26 | End: 2024-07-28 | Stop reason: HOSPADM

## 2024-07-26 RX ORDER — AMOXICILLIN 250 MG
1 CAPSULE ORAL 2 TIMES DAILY
Status: DISCONTINUED | OUTPATIENT
Start: 2024-07-26 | End: 2024-07-28 | Stop reason: HOSPADM

## 2024-07-26 RX ORDER — TRANEXAMIC ACID 10 MG/ML
1 INJECTION, SOLUTION INTRAVENOUS EVERY 30 MIN PRN
Status: DISCONTINUED | OUTPATIENT
Start: 2024-07-26 | End: 2024-07-28 | Stop reason: HOSPADM

## 2024-07-26 RX ORDER — LOPERAMIDE HCL 2 MG
2 CAPSULE ORAL
Status: DISCONTINUED | OUTPATIENT
Start: 2024-07-26 | End: 2024-07-28 | Stop reason: HOSPADM

## 2024-07-26 RX ORDER — PROCHLORPERAZINE 25 MG
25 SUPPOSITORY, RECTAL RECTAL EVERY 12 HOURS PRN
Status: DISCONTINUED | OUTPATIENT
Start: 2024-07-26 | End: 2024-07-28 | Stop reason: HOSPADM

## 2024-07-26 RX ORDER — HYDROCORTISONE 25 MG/G
CREAM TOPICAL 3 TIMES DAILY PRN
Status: DISCONTINUED | OUTPATIENT
Start: 2024-07-26 | End: 2024-07-28 | Stop reason: HOSPADM

## 2024-07-26 RX ORDER — ONDANSETRON 4 MG/1
4 TABLET, ORALLY DISINTEGRATING ORAL EVERY 6 HOURS PRN
Status: DISCONTINUED | OUTPATIENT
Start: 2024-07-26 | End: 2024-07-28 | Stop reason: HOSPADM

## 2024-07-26 RX ORDER — OXYTOCIN/0.9 % SODIUM CHLORIDE 30/500 ML
340 PLASTIC BAG, INJECTION (ML) INTRAVENOUS CONTINUOUS PRN
Status: DISCONTINUED | OUTPATIENT
Start: 2024-07-26 | End: 2024-07-28 | Stop reason: HOSPADM

## 2024-07-26 RX ORDER — MISOPROSTOL 200 UG/1
400 TABLET ORAL
Status: DISCONTINUED | OUTPATIENT
Start: 2024-07-26 | End: 2024-07-28 | Stop reason: HOSPADM

## 2024-07-26 RX ORDER — ACETAMINOPHEN 325 MG/1
975 TABLET ORAL EVERY 6 HOURS
Status: DISCONTINUED | OUTPATIENT
Start: 2024-07-26 | End: 2024-07-28 | Stop reason: HOSPADM

## 2024-07-26 RX ORDER — ONDANSETRON 2 MG/ML
4 INJECTION INTRAMUSCULAR; INTRAVENOUS EVERY 6 HOURS PRN
Status: DISCONTINUED | OUTPATIENT
Start: 2024-07-26 | End: 2024-07-28 | Stop reason: HOSPADM

## 2024-07-26 RX ORDER — ENOXAPARIN SODIUM 100 MG/ML
40 INJECTION SUBCUTANEOUS EVERY 24 HOURS
Status: DISCONTINUED | OUTPATIENT
Start: 2024-07-26 | End: 2024-07-28 | Stop reason: HOSPADM

## 2024-07-26 RX ORDER — PROCHLORPERAZINE MALEATE 10 MG
10 TABLET ORAL EVERY 6 HOURS PRN
Status: DISCONTINUED | OUTPATIENT
Start: 2024-07-26 | End: 2024-07-28 | Stop reason: HOSPADM

## 2024-07-26 RX ORDER — LIDOCAINE 40 MG/G
CREAM TOPICAL
Status: DISCONTINUED | OUTPATIENT
Start: 2024-07-26 | End: 2024-07-28 | Stop reason: HOSPADM

## 2024-07-26 RX ORDER — LOPERAMIDE HCL 2 MG
4 CAPSULE ORAL
Status: DISCONTINUED | OUTPATIENT
Start: 2024-07-26 | End: 2024-07-28 | Stop reason: HOSPADM

## 2024-07-26 RX ORDER — BISACODYL 10 MG
10 SUPPOSITORY, RECTAL RECTAL DAILY PRN
Status: DISCONTINUED | OUTPATIENT
Start: 2024-07-28 | End: 2024-07-28 | Stop reason: HOSPADM

## 2024-07-26 RX ORDER — AMOXICILLIN 250 MG
2 CAPSULE ORAL 2 TIMES DAILY
Status: DISCONTINUED | OUTPATIENT
Start: 2024-07-26 | End: 2024-07-28 | Stop reason: HOSPADM

## 2024-07-26 RX ADMIN — ACETAMINOPHEN 975 MG: 325 TABLET, FILM COATED ORAL at 02:23

## 2024-07-26 RX ADMIN — SENNOSIDES AND DOCUSATE SODIUM 1 TABLET: 8.6; 5 TABLET ORAL at 08:53

## 2024-07-26 RX ADMIN — NALBUPHINE HYDROCHLORIDE 5 MG: 10 INJECTION, SOLUTION INTRAMUSCULAR; INTRAVENOUS; SUBCUTANEOUS at 06:45

## 2024-07-26 RX ADMIN — KETOROLAC TROMETHAMINE 30 MG: 30 INJECTION, SOLUTION INTRAMUSCULAR at 03:25

## 2024-07-26 RX ADMIN — KETOROLAC TROMETHAMINE 30 MG: 30 INJECTION, SOLUTION INTRAMUSCULAR at 10:00

## 2024-07-26 RX ADMIN — SENNOSIDES AND DOCUSATE SODIUM 2 TABLET: 8.6; 5 TABLET ORAL at 21:32

## 2024-07-26 RX ADMIN — ENOXAPARIN SODIUM 40 MG: 40 INJECTION SUBCUTANEOUS at 14:17

## 2024-07-26 RX ADMIN — ACETAMINOPHEN 975 MG: 325 TABLET, FILM COATED ORAL at 15:38

## 2024-07-26 RX ADMIN — SODIUM CHLORIDE, SODIUM LACTATE, POTASSIUM CHLORIDE, CALCIUM CHLORIDE AND DEXTROSE MONOHYDRATE: 5; 600; 310; 30; 20 INJECTION, SOLUTION INTRAVENOUS at 03:25

## 2024-07-26 RX ADMIN — ACETAMINOPHEN 975 MG: 325 TABLET, FILM COATED ORAL at 21:33

## 2024-07-26 RX ADMIN — IBUPROFEN 800 MG: 800 TABLET, FILM COATED ORAL at 22:33

## 2024-07-26 RX ADMIN — ESCITALOPRAM OXALATE 20 MG: 20 TABLET ORAL at 21:33

## 2024-07-26 RX ADMIN — KETOROLAC TROMETHAMINE 30 MG: 30 INJECTION, SOLUTION INTRAMUSCULAR at 16:38

## 2024-07-26 RX ADMIN — ESCITALOPRAM OXALATE 20 MG: 20 TABLET ORAL at 03:25

## 2024-07-26 RX ADMIN — SENNOSIDES AND DOCUSATE SODIUM 1 TABLET: 8.6; 5 TABLET ORAL at 02:23

## 2024-07-26 RX ADMIN — ACETAMINOPHEN 975 MG: 325 TABLET, FILM COATED ORAL at 08:53

## 2024-07-26 ASSESSMENT — ACTIVITIES OF DAILY LIVING (ADL)
ADLS_ACUITY_SCORE: 25
ADLS_ACUITY_SCORE: 25
ADLS_ACUITY_SCORE: 29
ADLS_ACUITY_SCORE: 20
ADLS_ACUITY_SCORE: 29
ADLS_ACUITY_SCORE: 29
ADLS_ACUITY_SCORE: 25
ADLS_ACUITY_SCORE: 29
ADLS_ACUITY_SCORE: 25
ADLS_ACUITY_SCORE: 29
ADLS_ACUITY_SCORE: 25
ADLS_ACUITY_SCORE: 20
ADLS_ACUITY_SCORE: 29
ADLS_ACUITY_SCORE: 20
ADLS_ACUITY_SCORE: 25
ADLS_ACUITY_SCORE: 20
ADLS_ACUITY_SCORE: 25
ADLS_ACUITY_SCORE: 25
ADLS_ACUITY_SCORE: 29
ADLS_ACUITY_SCORE: 25

## 2024-07-26 NOTE — PROVIDER NOTIFICATION
Communication with Dr. Bean in department. Updated that pt. Had a prolonged deceleration. PIV access obtained and labs drawn. VORB for admission labs CBC, type and screen , trep.

## 2024-07-26 NOTE — PLAN OF CARE
Data: Patient presented to [facility]: 2024  6:49 PM.  Reason for maternal/fetal assessment is uterine contractions.  Pt reports ctx started at 1530. Patient denies leaking of vaginal fluid/rupture of membranes, vaginal bleeding, pelvic pressure, nausea, vomiting, headache, visual disturbances, epigastric or RUQ pain, significant edema. Patient reports fetal movement is normal. Patient is a 39w1d .  Prenatal record reviewed. Pregnancy has been uncomplicated.    Vital signs wnl. Support person is present.     Action: Verbal consent for EFM. Triage assessment completed.     Response: Patient verbalized agreement with plan. Will contact  with update and further orders.

## 2024-07-26 NOTE — OP NOTE
Surgery Date:  2024  Surgeon:  Katherine Bean MD,  Assistants:  None    Pre-op Diagnosis:  1. Intrauterine pregnancy at 39w1d     2. Cat II FHT  3. Latent phase of labor (1cm)   4. Obesity  Post-op Diagnosis:  1. Same      2. Viable female infant   Procedure:  Urgent Primary  section with 2 layer uterine closure via Pfannenstiel skin incision and low transverse uterine incision    Anesthesia: spinal  EBL:  775 mL  IVF:  Per anesthesia  UOP:  clear urine at the end of the case  Drains: Daniels Catheter   Specimens:  Cord blood, cord gases  Complications: None   Indications:   Eleanor Blake is a 29 year old  at 39w1d admitted for contractions.  During triage evaluation, baby was noted to have 2 prolonged decels.  Decision was made to proceed with urgent PCS.  The risks, benefits, and alternatives of  section were discussed with the patient, and she agreed to proceed.  Risks include, but not limited to, pain, bleeding, infection, injury to bowel/bladder/ureters, and need for further surgery.    Findings:   Liveborn female infant in cephalic presentation. Apgars pending. Weight 7# 11oz.  Mec stained amniotic fluid  Normal uterus, fallopian tubes, and ovaries.   Serosal area of upper left anterior uterus with persistent bleeding.  Cautery, pressure, TXA, and Surgicel powder was used to obtain hemostasis.       OPERATIVE PROCEDURE:  The patient was taken to the operating room where spinal anesthesia was placed.  Compression devices were placed on the legs, a Daniels catheter was placed, and a dose of antibiotics was given preoperatively.  The patient was prepped and draped in the usual sterile fashion, and a time out was performed.  Anesthesia was found to be adequate.    A Pfannenstiel skin incision was made with the scalpel and carried through to the underlying layer of fascia.  The fascia was incised in the midline and the incision was extended laterally with the Damon scissors.  The superior  aspect of the fascial incision was grasped with the Kocher clamps, elevated and the underlying rectus muscles were dissected off sharply with the Damon scissors.  Attention was then turned to the inferior aspect of the incision which, in a similar fashion, was grasped, tented up with the Kocher clamps, and the rectus muscles dissected off sharply.  The rectus muscles were then  in the midline, and the peritoneum was entered bluntly.  The peritoneal incision was extended superiorly and inferiorly with good visualization of the bladder.  The Ryder retractor was then inserted.  The lower uterine segment was incised in a transverse fashion with the scalpel.  The uterine incision was then extended cephalad and caudad with blunt dissection.  Amniotomy was performed with an Allis.  Mec-stained fluid was noted.  The infant's head was flexed, brought to the incision.  Head was noted to be asynclitic; despite proper flexion and fundal pressure, head did not deliver promptly.  Thus, vacuum was applied to flexion point and gentle traction applied. The head then was delivered without difficulty.  No nuchal cord was noted.  The shoulders and body were delivered without difficulty.  A vigorous infant was noted.  The infant's nose and mouth were bulb suctioned and the cord was clamped and cut after a 45 second delay.  The infant was handed off to the waiting nurse.      The placenta was then removed manually and the uterus was cleared of all clots and debris.  The uterine incision was closed with 0-Vicryl in a running, locking fashion.  A second imbricating layer was placed using 0-monocryl using a running, non-locking stich.  Hemostasis was obtained.  The tubes and ovaries were inspected and noted to be normal.  The gutters were cleared.  As noted above, serosal surface of upper left uterus was noted to be bleeding.  Pressure, cautery, and surgicel gel was applied, and TXA was given IV; and excellent hemostasis noted. The  Ryder retractor was removed.  The uterine incision and serosal area was again evaluated and noted to be hemostatic.  The subfascial areas were inspected, small bleeders were cauterized and hemostasis was confirmed.  The fascia was closed with 0-Vicryl in a running fashion.  The subcutaneous tissue was then inspected and small bleeders were cauterized with the bovie.  Hemostasis was confirmed.  The subcutaneous tissue was reapproximated with 3-0 plaingut using interrupted stitches.  The skin was then closed with 4-0 monocryl in a running subcuticular fasion.      The patient tolerated the procedure well.  Sponge, lap and needle counts were correct times two.  The patient and the baby were taken to the recovery room in stable condition.         Patient would be a candidate for TOLAC if desired.

## 2024-07-26 NOTE — PROGRESS NOTES
PHN in to see pt. to discuss [facility] programs.  Pt. is interested in [facility] nurse visit.  PHN placed referral to [facility] intake.  PHN discussed community resource guide and rack cards and left these resources with pt.

## 2024-07-26 NOTE — PROGRESS NOTES
[facility]  POD#1      Pt doing well. Ambulating, tolerating PO. Decreased lochia. Pain controlled. Breast feeding, coping well with infant.     Vitals:    24 0230 24 0245 24 0325 24 0415   BP:   (!) 86/61 113/67   Resp:    18   Temp:    99.1  F (37.3  C)   TempSrc:    Oral   SpO2: 95% 95% 95% 96%   Weight:         Abd soft, NTGR, FFBU, no fundal tenderness, incision well approximated with stitches, OR dressing dry with marked shadow of old dry blood. Will remove OR dressing at 24 hr nicola (tonight around 8 pm)  Ext no edema, no cyanosis, pulses +    Lab Results   Component Value Date    HGB 10.3 2024       A/P 29 year old  at 39w1d s/p POD#1 pLTCS secondary to Cat 2 FHTs.       -hemodynamically stable   - ABLA, asymptomatic   - continue prenatal vitamins   - iron rich diet and vit C  -Rh pos  -Diet regular  -Pain control with Tylenol, Motrin and Oxycodone  -DVT ppx - SCDs while on bed and ambulation  -bowel ppx- Senna/docusate, simethicone  -Breast feeding, encouraged, continue PNV's, proper hydration and caloric intake counseled.  -Tdap and flu given prenatally  -Rubella immune  -BC; IUD    BMI 37  - ppx for VTE with lovenox during admission      Plan; continue routine PP care, anticipate discharge POD2    Dr. Saldana   [phone]  2024 8:15 AM

## 2024-07-26 NOTE — PROVIDER NOTIFICATION
07/25/24 1950   Provider Notification   Provider Name/Title    Method of Notification In Department        updated on FHR prolonged decel for 7 mins down to johnathan of 60s. Pt repositioned side to side with recovery. IV initiated and fluid bolus started. Bloodwork collected. MD will come bedside to discuss plan with pt. Plan for COVID swab.

## 2024-07-26 NOTE — LACTATION NOTE
Lactation in to see patient. Baby due for a feed at time of visit. Reviewed normal lactation course, first 24 hours sleepiness, cluster feeding after 24 hours, supply and demand, when milk transitions, what an effective feed looks like. Brought baby STS in football hold with nipple shield. Baby initially gagging and needing burping before latch. Writer had baby suck on gloved finger then moved to the breast. Baby latched and needing stimulation and breast compressions to stay active. Baby able to move to a consistent suck pattern with a few swallows heard. Encouraged to do STS before feeds and watch for feeding cues. All questions answered at this time.

## 2024-07-26 NOTE — ANESTHESIA CARE TRANSFER NOTE
Patient: Eleanor Blake    Procedure: Procedure(s):   section       Diagnosis: 39 weeks gestation of pregnancy [Z3A.39]  Diagnosis Additional Information: No value filed.    Anesthesia Type:   No value filed.     Note:    Oropharynx: spontaneously breathing  Level of Consciousness: awake  Oxygen Supplementation: room air    Independent Airway: airway patency satisfactory and stable  Dentition: dentition unchanged  Vital Signs Stable: post-procedure vital signs reviewed and stable  Report to RN Given: handoff report given  Patient transferred to: Labor and Delivery  Comments: To L/D, report to RN.        Vitals:  Vitals Value Taken Time   /62 24 2125   Temp     Pulse     Resp     SpO2     Vitals shown include unfiled device data.    Electronically Signed By: TERESA Funes CRNA  2024  9:25 PM

## 2024-07-26 NOTE — PROGRESS NOTES
While I was in room discussing plan with patient, another prolonged decel occurred.  Mati to 60s, duration approx 6-7 mins.  Slow recovery even after multiple position changes.  IV fluid bolus already running.  Currently, FHT back up to the 130s.      Given this 2nd spontaneous prolonged decel, will move towards urgent PCS now.  Reviewed risks.  Patient and  understands and agrees to plan.  Nursing and Anesthesia aware; able to go within 30 mins or sooner.

## 2024-07-26 NOTE — PLAN OF CARE
"Goal Outcome Evaluation:      Plan of Care Reviewed With: patient    Overall Patient Progress: improvingOverall Patient Progress: improving    Outcome Evaluation: pain managed, VSS, working on breastfeeding    Patient meeting expected goals. Walked to bathroom, garcia removed. Steady gait. Due to void by 1330. VSS. Pain is being managed with Tylenol and Toradol; 1/10.  Incision to low abdomen is with island dressing with dried marked drainage; no change, and no signs of infection noted to surrounding tissue. Using nipple shield due to smoother nipples. Writer assisted with latch, infant disinterested. Had mother do STS and feeding cues noted. Infant did latch on right side with shield. Colostrum drops noted, though small. Encouraged Patient to call out for latch assist. Safe slee reviewed and to feed on demand as well as every 2-3 hours. Tolerating food/fluids, and encouraged fluids. Patient had improved output. Spouse supportive and at bedside.      Problem: Adult Inpatient Plan of Care  Goal: Plan of Care Review  Description: The Plan of Care Review/Shift note should be completed every shift.  The Outcome Evaluation is a brief statement about your assessment that the patient is improving, declining, or no change.  This information will be displayed automatically on your shift  note.  Outcome: Progressing  Flowsheets (Taken 7/26/2024 1052)  Outcome Evaluation: pain managed, VSS, working on breastfeeding  Plan of Care Reviewed With: patient  Overall Patient Progress: improving  Goal: Patient-Specific Goal (Individualized)  Description: You can add care plan individualizations to a care plan. Examples of Individualization might be:  \"Parent requests to be called daily at 9am for status\", \"I have a hard time hearing out of my right ear\", or \"Do not touch me to wake me up as it startles  me\".  Outcome: Progressing  Goal: Absence of Hospital-Acquired Illness or Injury  Outcome: Progressing  Intervention: Prevent Skin " Injury  Recent Flowsheet Documentation  Taken 2024 by Yolanda Martinez RN  Body Position: position changed independently  Taken 2024 by Yolanda Martinez RN  Body Position: position changed independently  Intervention: Prevent and Manage VTE (Venous Thromboembolism) Risk  Recent Flowsheet Documentation  Taken 2024 by Yolanda Martinez RN  VTE Prevention/Management: SCDs on (sequential compression devices)  Intervention: Prevent Infection  Recent Flowsheet Documentation  Taken 2024 by Yolanda Martinez RN  Infection Prevention:   rest/sleep promoted   hand hygiene promoted  Goal: Optimal Comfort and Wellbeing  Outcome: Progressing  Intervention: Monitor Pain and Promote Comfort  Recent Flowsheet Documentation  Taken 2024 by Yolanda Martinez RN  Pain Management Interventions:   medication (see MAR)   pain management plan reviewed with patient/caregiver   pillow support provided  Intervention: Provide Person-Centered Care  Recent Flowsheet Documentation  Taken 2024 by Yolanda Martinez RN  Trust Relationship/Rapport:   care explained   choices provided   emotional support provided   empathic listening provided   questions answered   questions encouraged   reassurance provided   thoughts/feelings acknowledged  Goal: Readiness for Transition of Care  Outcome: Progressing     Problem: Postpartum ( Delivery)  Goal: Successful Parent Role Transition  Outcome: Progressing  Intervention: Support Parent Role Transition  Recent Flowsheet Documentation  Taken 2024 by Yolanda Martinez RN  Supportive Measures:   active listening utilized   decision-making supported   goal-setting facilitated   positive reinforcement provided   self-care encouraged   verbalization of feelings encouraged  Parent-Child Attachment Promotion:   caring behavior modeled   cue recognition promoted   interaction encouraged    parent/caregiver presence encouraged   participation in care promoted   positive reinforcement provided   rooming-in promoted   skin-to-skin contact encouraged   strengths emphasized  Goal: Hemostasis  Outcome: Progressing  Goal: Effective Bowel Elimination  Outcome: Progressing  Intervention: Enhance Bowel Motility and Elimination  Recent Flowsheet Documentation  Taken 7/26/2024 0900 by Yolanda Martinez RN  Bowel Motility Enhancement: fluid intake encouraged  Bowel Elimination Promotion: adequate fluid intake promoted  Goal: Fluid and Electrolyte Balance  Outcome: Progressing  Goal: Absence of Infection Signs and Symptoms  Outcome: Progressing  Goal: Anesthesia/Sedation Recovery  Outcome: Progressing  Goal: Optimal Pain Control and Function  Outcome: Progressing  Intervention: Prevent or Manage Pain  Recent Flowsheet Documentation  Taken 7/26/2024 0900 by Yolanda Martinez RN  Perineal Care:   absorbent brief/pad changed   perineum cleansed   perineal hygiene encouraged  Taken 7/26/2024 0852 by Yolanda Martinez RN  Pain Management Interventions:   medication (see MAR)   pain management plan reviewed with patient/caregiver   pillow support provided  Perineal Care:   absorbent brief/pad changed   perineal hygiene encouraged   perineum cleansed  Goal: Nausea and Vomiting Relief  Outcome: Progressing  Goal: Effective Urinary Elimination  Outcome: Progressing  Intervention: Monitor and Manage Urinary Retention  Recent Flowsheet Documentation  Taken 7/26/2024 0900 by Yolanda Martinez RN  Urinary Elimination Promotion: frequent voiding encouraged  Goal: Effective Oxygenation and Ventilation  Outcome: Progressing  Intervention: Optimize Oxygenation and Ventilation  Recent Flowsheet Documentation  Taken 7/26/2024 0900 by Yolanda Martinez RN  Head of Bed (HOB) Positioning: HOB at 60-90 degrees  Taken 7/26/2024 0852 by Yolanda Martinez RN  Head of Bed (HOB) Positioning: HOB at  60-90 degrees

## 2024-07-26 NOTE — H&P
Labor & Delivery History & Physical  Patient Name:  Eleanor Blake   MRN:  [id number]  Age:  29 year old    YOB: 1995      Subjective:    Eleanor Blake is a 29 year old  female with JUSTINA: 2024, Alternate JUSTINA Entry,  Gestational Age: 39w1d who presents for contractions.  Patient reports contractions since about 3:30 this afternoon.  Since then, contractions more frequent       Patient denies leaking of fluid or vaginal bleeding.  Fetal Movement: active.       Prenatal care complicated by:  - O pos  - NIPS with increased risk of Monosomy X; normal Karyotype on Amniocentesis  - GBS neg  - Obesity   - Anx: mood stable, no SI/HI.  Cont Lexapro 20mg      Pregnancy Episode Problems (from 24 to present)       No problems associated with this episode.          OB History    Para Term  AB Living   1 0 0 0 0 0   SAB IAB Ectopic Multiple Live Births   0 0 0 0 0      # Outcome Date GA Lbr Shay/2nd Weight Sex Type Anes PTL Lv   1 Current              Past Medical History:   Diagnosis Date    Anxiety      Past Surgical History:   Procedure Laterality Date    ENT SURGERY       Social History     Tobacco Use    Smoking status: Never    Smokeless tobacco: Never   Substance Use Topics    Alcohol use: Not Currently    Drug use: Never      History   Drug Use Unknown     History reviewed. No pertinent family history.  [unfilled]   Medications Prior to Admission   Medication Sig Dispense Refill Last Dose    aspirin 81 MG EC tablet Take 1 tablet by mouth daily       escitalopram (LEXAPRO) 20 MG tablet Take 20 mg by mouth daily       Prenatal Vit-Fe Fumarate-FA (PRENATAL MULTIVITAMIN  PLUS IRON) 27-1 MG TABS Take by mouth daily        Most Recent Immunizations   Administered Date(s) Administered    COVID-19 Bivalent 12+ (Pfizer) 2023    COVID-19 MONOVALENT 12+ (Pfizer) 2021    COVID-19 Monovalent 18+ (Moderna) 2021       Review of Systems - NEGATIVE  "FOR  Fevers  Chills  Headache  Visual changes  Ear pain  Sore throat  Cough  Shortness of breath  Chest pain  Palpitations  Constipation  Diarrhea  Vomiting  Bloody stools  Hematuria  Dysuria  Muscle weakness  Gait disturbance    Objective:  Temp:  [98.1  F (36.7  C)] 98.1  F (36.7  C)  Resp:  [18] 18  BP: (108)/(63) 108/63  213 lbs 13.54 oz      General: General appearance: alert, well appearing, and in no distress.   Lungs:   unlabored   Heart:  regular rate and rhythm   Abdomen: Gravid, nontender between contractions   Moraine: Contraction Frequency (min): q 5 min   FHT: Baseline 160 BPM   Fetal heart variability:moderate  Fetal heart rate accelerations:  Present 15 x 15  Fetal heart rate decelerations: present - one prolonged decel with johnathan of 60, lasting 4 mins  Fetal heart rate interpretation:  Category I   Speculum: NA    Cervix: Dilation:   1  Effacement:     50  Station:            -3    Exam per nurse    Extremities: Trace to 1+ edema bilateral, symmetric edema; neg Mamie's sign      Lab Review:    No results found for: \"ABORH\", \"HGB\", \"HCT\", \"RPR\", \"HEPBSAG\", \"TSH\", \"HGBA1C\"      Assessment  Eleanor Blake is a 29 year old  female with JUSTINA: 2024, Alternate JUSTINA Entry,  Gestational Age: 39w1d who presents with contractions and spontaneous decel      Plan  - Cat II FHT: Given spontaneous decel and gestational age >39w, will admit and proceed with CR/IOL.  Discussed options, she opts for PO cytotec.     - Epidural upon request   - Continuous CEFM   - If persistent cat II, would proceed with PCS.   - Cold symptoms: will swab for COVID/RSV/flu  - O pos  - NIPS with increased risk of Monosomy X; normal Karyotype on Amniocentesis  - GBS neg  - Obesity   - Anx: mood stable, no SI/HI.  Cont Lexapro 20mg  - PP Contraception: Kyleena IUD  - Dispo: Anticipate       "

## 2024-07-26 NOTE — PLAN OF CARE
Data: Eleanor Blake transferred to 425 via cart at 0010. Baby transferred via parent's arms.  Action: Receiving unit notified of transfer: Yes. Patient and family notified of room change. Report given to Luz Marina OLIVARES at 0010. Belongings sent to receiving unit. Accompanied by Registered Nurse. Oriented patient to surroundings. Call light within reach. ID bands double-checked with receiving RN.  Response: Patient tolerated transfer and is stable.    Patients mobililty level scored using the bedside mobility assistance tool (BMAT). Patient is at a mobility level test number: 2. Mobility equipment used: hovermat. Required assist of 2 staff members. Further use of BMAT scoring required.

## 2024-07-26 NOTE — PLAN OF CARE
Goal Outcome Evaluation:      Plan of Care Reviewed With: patient    Overall Patient Progress: improvingOverall Patient Progress: improving    Outcome Evaluation: pain managed, VSS, working on breastfeeding    Patient up independent in room, meeting all personal and infant needs.  Daniels removed. Voided x 1, Due to void a second time; hat in toilet. Encouraged fluids at many different times. Involved spouse to keep water bottle filled. Fundus firm, midline, light lochia, no clots.

## 2024-07-26 NOTE — ANESTHESIA POSTPROCEDURE EVALUATION
Patient: Eleanor Blake    Procedure: Procedure(s):   section       Anesthesia Type:  Spinal    Note:  Disposition: Inpatient   Postop Pain Control:    PONV: No   Neuro/Psych: Uneventful            Sign Out: Acceptable/Baseline neuro status   Airway/Respiratory: Uneventful            Sign Out: Acceptable/Baseline resp. status   CV/Hemodynamics: Uneventful            Sign Out: Acceptable CV status; No obvious hypovolemia; No obvious fluid overload   Other NRE:    DID A NON-ROUTINE EVENT OCCUR? No           Last vitals:  Vitals:    24 2156 24 2211 24 2222   BP: 107/55 97/61    Resp:      Temp:   98  F (36.7  C)   SpO2:          Electronically Signed By: Yadi Becerra MD  2024  10:36 PM

## 2024-07-26 NOTE — DISCHARGE SUMMARY
[facility] Discharge Summary    Eleanor Blake MRN# [id number]   Age: 29 year old YOB: 1995     Date of Admission:  2024  Date of Discharge::  2024  Admitting Physician:  Katherine Bean MD  Discharge Physician:  Char eHrron MD             Admission Diagnoses:   -IUP at 39w1d  -non-reassuring fetal heart tones  -SILVIA, mild, asymptomatic          Discharge Diagnosis:     -Same, now delivered          Procedures:     Procedure(s): Primary low transverse  section                Medications Prior to Admission:     Medications Prior to Admission   Medication Sig Dispense Refill Last Dose    aspirin 81 MG EC tablet Take 1 tablet by mouth daily       escitalopram (LEXAPRO) 20 MG tablet Take 20 mg by mouth daily       Prenatal Vit-Fe Fumarate-FA (PRENATAL MULTIVITAMIN  PLUS IRON) 27-1 MG TABS Take by mouth daily                Discharge Medications:     Current Discharge Medication List        CONTINUE these medications which have NOT CHANGED    Details   aspirin 81 MG EC tablet Take 1 tablet by mouth daily      escitalopram (LEXAPRO) 20 MG tablet Take 20 mg by mouth daily      Prenatal Vit-Fe Fumarate-FA (PRENATAL MULTIVITAMIN  PLUS IRON) 27-1 MG TABS Take by mouth daily                   Brief Admission History:   Eleanor Blake is a 29 year old  female with JUSTINA: 2024, Alternate JUSTINA Entry,  Gestational Age: 39w1d who presents for contractions.  Patient reports contractions since about 3:30 this afternoon.  Since then, contractions more frequent        Patient denies leaking of fluid or vaginal bleeding.  Fetal Movement: active.     Assessment  Eleanor Blake is a 29 year old  female with JUSTINA: 2024, Alternate JUSTINA Entry,  Gestational Age: 39w1d who presents with contractions and spontaneous decel        Plan  - Cat II FHT: Given spontaneous decel and gestational age >39w, will admit and proceed with CR/IOL.  Discussed options, she opts for PO cytotec.                 - Epidural upon request              - Continuous CEFM              - If persistent cat II, would proceed with PCS.   - Cold symptoms: will swab for COVID/RSV/flu  - O pos  - NIPS with increased risk of Monosomy X; normal Karyotype on Amniocentesis  - GBS neg  - Obesity   - Anx: mood stable, no SI/HI.  Cont Lexapro 20mg  - PP Contraception: Kyleena IUD  - Dispo: Anticipate         While I was in room discussing plan with patient, another prolonged decel occurred.  Mati to 60s, duration approx 6-7 mins.  Slow recovery even after multiple position changes.  IV fluid bolus already running.  Currently, FHT back up to the 130s.       Given this 2nd spontaneous prolonged decel, will move towards urgent PCS now.  Reviewed risks.  Patient and  understands and agrees to plan.  Nursing and Anesthesia aware; able to go within 30 mins or sooner.                 Intrapartum/Intraoperative course   Surgery Date:            2024  Surgeon:        Katherine Bean MD,  Assistants:     None     Pre-op Diagnosis:     1. Intrauterine pregnancy at 39w1d                                      2. Cat II FHT  3. Latent phase of labor (1cm)   4. Obesity  Post-op Diagnosis:   1. Same                                       2. Viable female infant   Procedure:     Urgent Primary  section with 2 layer uterine closure via Pfannenstiel skin incision and low transverse uterine incision     Anesthesia:    spinal  EBL:    775 mL  IVF:     Per anesthesia  UOP:   clear urine at the end of the case  Drains: Daniels Catheter   Specimens:    Cord blood, cord gases  Complications:          None   Indications:   Eleanor Blake is a 29 year old  at 39w1d admitted for contractions.  During triage evaluation, baby was noted to have 2 prolonged decels.  Decision was made to proceed with urgent PCS.  The risks, benefits, and alternatives of  section were discussed with the patient, and she agreed to proceed.  Risks include, but  not limited to, pain, bleeding, infection, injury to bowel/bladder/ureters, and need for further surgery.     Findings:   Liveborn female infant in cephalic presentation. Apgars pending. Weight 7# 11oz.  Mec stained amniotic fluid  Normal uterus, fallopian tubes, and ovaries.   Serosal area of upper left anterior uterus with persistent bleeding.  Cautery, pressure, and Surgicel powder was used to obtain hemostasis.         OPERATIVE PROCEDURE:  The patient was taken to the operating room where spinal anesthesia was placed.  Compression devices were placed on the legs, a Daniels catheter was placed, and a dose of antibiotics was given preoperatively.  The patient was prepped and draped in the usual sterile fashion, and a time out was performed.  Anesthesia was found to be adequate.     A Pfannenstiel skin incision was made with the scalpel and carried through to the underlying layer of fascia.  The fascia was incised in the midline and the incision was extended laterally with the Damon scissors.  The superior aspect of the fascial incision was grasped with the Kocher clamps, elevated and the underlying rectus muscles were dissected off sharply with the Damon scissors.  Attention was then turned to the inferior aspect of the incision which, in a similar fashion, was grasped, tented up with the Kocher clamps, and the rectus muscles dissected off sharply.  The rectus muscles were then  in the midline, and the peritoneum was entered bluntly.  The peritoneal incision was extended superiorly and inferiorly with good visualization of the bladder.  The Ryder retractor was then inserted.  The lower uterine segment was incised in a transverse fashion with the scalpel.  The uterine incision was then extended cephalad and caudad with blunt dissection.  Amniotomy was performed with an Allis.  Mec-stained fluid was noted.  The infant's head was flexed, brought to the incision.  Head was noted to be asynclitic; despite proper  flexion and fundal pressure, head did not deliver promptly.  Thus, vacuum was applied to flexion point and gentle traction applied. The head then was delivered without difficulty.  No nuchal cord was noted.  The shoulders and body were delivered without difficulty.  A vigorous infant was noted.  The infant's nose and mouth were bulb suctioned and the cord was clamped and cut after a 45 second delay.  The infant was handed off to the waiting nurse.       The placenta was then removed manually and the uterus was cleared of all clots and debris.  The uterine incision was closed with 0-Vicryl in a running, locking fashion.  A second imbricating layer was placed using 0-monocryl using a running, non-locking stich.  Hemostasis was obtained.  The tubes and ovaries were inspected and noted to be normal.  The gutters were cleared.  As noted above, serosal surface of upper left uterus was noted to be bleeding.  Pressure, cautery, and surgicel gel was applied and excellent hemostasis noted. The Ryder retractor was removed.  The uterine incision and serosal area was again evaluated and noted to be hemostatic.  The subfascial areas were inspected, small bleeders were cauterized and hemostasis was confirmed.  The fascia was closed with 0-Vicryl in a running fashion.  The subcutaneous tissue was then inspected and small bleeders were cauterized with the bovie.  Hemostasis was confirmed.  The subcutaneous tissue was reapproximated with 3-0 plaingut using interrupted stitches.  The skin was then closed with 4-0 monocryl in a running subcuticular fasion.       The patient tolerated the procedure well.  Sponge, lap and needle counts were correct times two.  The patient and the baby were taken to the recovery room in stable condition.          Patient would be a candidate for TOLAC if desired.      Postpartum Course   The patient's hospital course was unremarkable.  She recovered as anticipated and experienced no post-operative  complications.  On discharge, her pain was well controlled. Vaginal bleeding is similar to peak menstrual flow.  Voiding without difficulty.  Ambulating well and tolerating a normal diet.  No fever or significant wound drainage.  Breastfeeding well.  Infant is stable.  No bowel movement yet.  She was discharged on post-partum day #3.    Post-partum hemoglobin:   Hemoglobin   Date Value Ref Range Status   07/25/2024 12.1 11.7 - 15.7 g/dL Final       Rh status: positive Rhogam is not indicated.     Rubella status: immune. MMR is not indicated            Discharge Instructions and Follow-Up:     Discharge diet: Regular   Discharge activity: No lifting greater than 15 lbs, pushing, pulling, or other strenuous activity for 6 weeks. Pelvic rest for 6 weeks including no sexual intercourse, tampons, or douching. No driving until you can slam on the breaks without pain or while on narcotic pain medications.    Discharge follow-up: Follow up with primary OB for routine postpartum visit in 6 weeks     Wound care: Keep incision clean and dry           Discharge Disposition:     Discharged to home      Anne Marie Furuseth, MD [facility]

## 2024-07-26 NOTE — PLAN OF CARE
Vital signs stable, one hypotensive pressure overnight. Postpartum assessment WDL. Uterine fundus is firm and midline. Scant vaginal bleeding. Using Tylenol and toradol for pain with good relief. Incision assessment WDL, drainage marked. Up and ambulated once overnight, free of dizziness. Daniels in place due to lower urine output, urine concentrated. D5LR running at 125 mL/hr.  Tolerating a regular diet. Breastfeeding with a shield every 3 hours. Bonding well with . Questions/concerns addressed.      Problem: Adult Inpatient Plan of Care  Goal: Plan of Care Review  Description: The Plan of Care Review/Shift note should be completed every shift.  The Outcome Evaluation is a brief statement about your assessment that the patient is improving, declining, or no change.  This information will be displayed automatically on your shift  note.  2024 by Luz Marina Carlson RN  Outcome: Progressing  2024 by Luz Marina Carlson RN  Outcome: Progressing  Goal: Absence of Hospital-Acquired Illness or Injury  2024 by Luz Marina Carlson RN  Outcome: Progressing  2024 by Luz Marina Carlson RN  Outcome: Progressing  Intervention: Prevent Skin Injury  Recent Flowsheet Documentation  Taken 2024 by Luz Marina Carlson RN  Body Position: position changed independently  Goal: Optimal Comfort and Wellbeing  2024 by Luz Marina Carlson RN  Outcome: Progressing  2024 by Luz Marina Carlson RN  Outcome: Progressing  Intervention: Provide Person-Centered Care  Recent Flowsheet Documentation  Taken 2024 by Luz Marina Carlson RN  Trust Relationship/Rapport:   care explained   choices provided   questions answered   questions encouraged   thoughts/feelings acknowledged  Goal: Readiness for Transition of Care  2024 by Luz Marina Carlson RN  Outcome: Progressing  2024 by Luz Marina Carlson RN  Outcome: Progressing     Problem: Postpartum (  Delivery)  Goal: Successful Parent Role Transition  7/26/2024 0650 by Luz Marina Carlson RN  Outcome: Progressing  7/26/2024 0650 by Luz Marina Carlson RN  Outcome: Progressing  Goal: Hemostasis  7/26/2024 0650 by Luz Marina Carlson RN  Outcome: Progressing  7/26/2024 0650 by Luz Marina Carlson, RN  Outcome: Progressing  Goal: Effective Bowel Elimination  7/26/2024 0650 by Luz Marina Carlson RN  Outcome: Progressing  7/26/2024 0650 by Luz Marina Carlson RN  Outcome: Progressing  Goal: Fluid and Electrolyte Balance  7/26/2024 0650 by Luz Marina Carlson RN  Outcome: Progressing  7/26/2024 0650 by Luz Marina Carlson RN  Outcome: Progressing  Goal: Absence of Infection Signs and Symptoms  7/26/2024 0650 by Luz Marina Carlson RN  Outcome: Progressing  7/26/2024 0650 by Luz Marina Carlson RN  Outcome: Progressing  Goal: Anesthesia/Sedation Recovery  7/26/2024 0650 by Luz Marina Carlson RN  Outcome: Progressing  7/26/2024 0650 by Luz Marina Carlson RN  Outcome: Progressing  Goal: Optimal Pain Control and Function  7/26/2024 0650 by Luz Marina Carlson RN  Outcome: Progressing  7/26/2024 0650 by Luz Marina Carlson, RN  Outcome: Progressing  Goal: Nausea and Vomiting Relief  7/26/2024 0650 by Luz Marina Carlson RN  Outcome: Progressing  7/26/2024 0650 by Luz Marina Carlson, RN  Outcome: Progressing  Goal: Effective Urinary Elimination  7/26/2024 0650 by Luz Marina Carlson, RN  Outcome: Progressing  7/26/2024 0650 by Luz Marina Carlson RN  Outcome: Progressing  Goal: Effective Oxygenation and Ventilation  7/26/2024 0650 by Luz Marina Carlson RN  Outcome: Progressing  7/26/2024 0650 by Luz Marina Carlson, RN  Outcome: Progressing  Intervention: Optimize Oxygenation and Ventilation  Recent Flowsheet Documentation  Taken 7/26/2024 0018 by Luz Marina Carlson, RN  Head of Bed (HOB) Positioning: HOB at 45 degrees   Goal Outcome Evaluation:

## 2024-07-26 NOTE — ANESTHESIA PROCEDURE NOTES
"Intrathecal injection Procedure Note    Pre-Procedure   Staff -        Anesthesiologist:  Yadi Becerra MD       Performed By: anesthesiologist       Referred By: Vikas       Location: OR       Pre-Anesthestic Checklist: patient identified, IV checked, risks and benefits discussed, informed consent, monitors and equipment checked, pre-op evaluation, at physician/surgeon's request and post-op pain management  Timeout:       Correct Patient: Yes        Correct Procedure: Yes        Correct Site: Yes        Correct Position: Yes   Procedure Documentation  Procedure: intrathecal injection       Diagnosis: primary C/S       Patient Position: sitting       Patient Prep/Sterile Barriers: sterile gloves, mask, patient draped       Skin prep: Chloraprep       Insertion Site: L4-5. (midline approach).       Needle Gauge: 25.        Needle Length (Inches): 3.5        Spinal Needle Type: Pencan       Introducer used       # of attempts: 1 and  # of redirects:  0    Assessment/Narrative         Paresthesias: No.       Sensory Level: T5       CSF fluid: clear.    Medication(s) Administered   0.75% Hyperbaric Bupivacaine (Intrathecal) - Intrathecal   1.6 mL - 7/25/2024 8:24:00 PM  Fentanyl PF (Intrathecal) - Intrathecal   15 mcg - 7/25/2024 8:24:00 PM  Morphine PF 1 mg/mL (Intrathecal) - Intrathecal   0.15 mg - 7/25/2024 8:24:00 PM    FOR [facility] ([facility] ONLY:   Pain Team Contact information: please page the Pain Team Via [employer]. Search \"Pain\". During daytime hours, please page the attending first. At night please page the resident first.      "

## 2024-07-26 NOTE — PROVIDER NOTIFICATION
07/25/24 1926   Provider Notification   Provider Name/Title    Method of Notification In Department   Notification Reason Status Update;Patient Arrived       Dr. Katherine Bean informed of patient arrival and assessment including the following:    Reason for maternal/fetal assessment uterine contractions. Pt reports ctx started at 1530. Fetal status concerning for tachycardia.   Ctx Q4-5 in 10 mins, palpating mild to moderate. Tracing seen by MD. SVE by Meliza OLIVARES 1/50/-3. Pt states she has some cold symptom last couple of days, sore throat and runny nose.  Plan per provider/orders received for LR 1L bolus and plan to reassess SVE in a couple hours.

## 2024-07-26 NOTE — PROVIDER NOTIFICATION
24   Provider Notification   Provider Name/Title    Method of Notification At Bedside        at bedside to discuss plan for admission and cervical ripening with PO cytotec for category 2 tracing. FHR prolonged decel for 7 mins down to johnathan of 60s while MD at bedside. Pt repositioned side to side and on hands and knees. Fluids bolus running. SVE by MD unchanged. MD discussed plan to move towards . Pt agreeable with plan.

## 2024-07-26 NOTE — PROVIDER NOTIFICATION
07/26/24 0334   Provider Notification   Provider Name/Title Dr Bean   Method of Notification In Department   Notification Reason Vital Signs Change     MD notified of blood pressure of 86/61. Pt asymptomatic, D5LR running at 125/hr. Writer has only emptied 100 mL of urine since leaving the OR . Plan to reassess BP in 1 hour, order to give bolus of 500 mL of LR if BP less than 90/40. MD ok to leave garcia in until AM.

## 2024-07-27 PROCEDURE — 250N000011 HC RX IP 250 OP 636: Performed by: OBSTETRICS & GYNECOLOGY

## 2024-07-27 PROCEDURE — 250N000013 HC RX MED GY IP 250 OP 250 PS 637: Performed by: OBSTETRICS & GYNECOLOGY

## 2024-07-27 PROCEDURE — 999N000081 HC STATISTIC IP LACTATION SERVICES 31-45 MIN

## 2024-07-27 PROCEDURE — 120N000013 HC R&B IMCU

## 2024-07-27 RX ORDER — OXYCODONE HYDROCHLORIDE 5 MG/1
5 TABLET ORAL EVERY 6 HOURS PRN
Qty: 12 TABLET | Refills: 0 | Status: SHIPPED | OUTPATIENT
Start: 2024-07-27

## 2024-07-27 RX ORDER — IBUPROFEN 800 MG/1
800 TABLET, FILM COATED ORAL EVERY 6 HOURS
Qty: 40 TABLET | Refills: 1 | Status: SHIPPED | OUTPATIENT
Start: 2024-07-27

## 2024-07-27 RX ORDER — AMOXICILLIN 250 MG
1 CAPSULE ORAL 2 TIMES DAILY PRN
Qty: 30 TABLET | Refills: 0 | Status: SHIPPED | OUTPATIENT
Start: 2024-07-27

## 2024-07-27 RX ORDER — HYDROCORTISONE 25 MG/G
CREAM TOPICAL 3 TIMES DAILY PRN
Qty: 30 G | Refills: 0 | Status: SHIPPED | OUTPATIENT
Start: 2024-07-27

## 2024-07-27 RX ORDER — ACETAMINOPHEN 325 MG/1
975 TABLET ORAL EVERY 6 HOURS
Qty: 40 TABLET | Refills: 1 | Status: SHIPPED | OUTPATIENT
Start: 2024-07-27

## 2024-07-27 RX ORDER — MODIFIED LANOLIN
OINTMENT (GRAM) TOPICAL
Qty: 7 G | Refills: 3 | Status: SHIPPED | OUTPATIENT
Start: 2024-07-27

## 2024-07-27 RX ORDER — SIMETHICONE 80 MG
80 TABLET,CHEWABLE ORAL 4 TIMES DAILY PRN
Qty: 40 TABLET | Refills: 0 | Status: SHIPPED | OUTPATIENT
Start: 2024-07-27

## 2024-07-27 RX ADMIN — ACETAMINOPHEN 975 MG: 325 TABLET, FILM COATED ORAL at 22:31

## 2024-07-27 RX ADMIN — IBUPROFEN 800 MG: 800 TABLET, FILM COATED ORAL at 10:49

## 2024-07-27 RX ADMIN — SENNOSIDES AND DOCUSATE SODIUM 1 TABLET: 8.6; 5 TABLET ORAL at 19:47

## 2024-07-27 RX ADMIN — ENOXAPARIN SODIUM 40 MG: 40 INJECTION SUBCUTANEOUS at 14:14

## 2024-07-27 RX ADMIN — ACETAMINOPHEN 975 MG: 325 TABLET, FILM COATED ORAL at 04:25

## 2024-07-27 RX ADMIN — ACETAMINOPHEN 975 MG: 325 TABLET, FILM COATED ORAL at 16:39

## 2024-07-27 RX ADMIN — IBUPROFEN 800 MG: 800 TABLET, FILM COATED ORAL at 22:31

## 2024-07-27 RX ADMIN — SENNOSIDES AND DOCUSATE SODIUM 2 TABLET: 8.6; 5 TABLET ORAL at 10:49

## 2024-07-27 RX ADMIN — ACETAMINOPHEN 975 MG: 325 TABLET, FILM COATED ORAL at 10:49

## 2024-07-27 RX ADMIN — IBUPROFEN 800 MG: 800 TABLET, FILM COATED ORAL at 16:39

## 2024-07-27 RX ADMIN — IBUPROFEN 800 MG: 800 TABLET, FILM COATED ORAL at 04:25

## 2024-07-27 RX ADMIN — ESCITALOPRAM OXALATE 20 MG: 20 TABLET ORAL at 22:31

## 2024-07-27 ASSESSMENT — ACTIVITIES OF DAILY LIVING (ADL)
ADLS_ACUITY_SCORE: 20

## 2024-07-27 NOTE — PLAN OF CARE
"Goal Outcome Evaluation:      Plan of Care Reviewed With: patient    Overall Patient Progress: improvingOverall Patient Progress: improving    Outcome Evaluation: vss, pain managed. incision MAAME    Patient meeting expected goals. Is up independent in room, meeting all personal and infant needs. VSS. Pain is being managed with Tylenol and Ibuprofen. Voiding with out difficulty. Incision to low abdomen is now MAAME, as MD removed this morning. Is well approximated, with dried drainage on steri strips and no signs of infection noted to surrounding tissue. Breastfeeding is going well and bonding well with infant. Using nipple shield. Latch improving.       Problem: Adult Inpatient Plan of Care  Goal: Plan of Care Review  Description: The Plan of Care Review/Shift note should be completed every shift.  The Outcome Evaluation is a brief statement about your assessment that the patient is improving, declining, or no change.  This information will be displayed automatically on your shift  note.  Outcome: Progressing  Flowsheets (Taken 7/27/2024 1139)  Outcome Evaluation: vss, pain managed. incision MAAME  Plan of Care Reviewed With: patient  Overall Patient Progress: improving  Goal: Patient-Specific Goal (Individualized)  Description: You can add care plan individualizations to a care plan. Examples of Individualization might be:  \"Parent requests to be called daily at 9am for status\", \"I have a hard time hearing out of my right ear\", or \"Do not touch me to wake me up as it startles  me\".  Outcome: Progressing  Goal: Absence of Hospital-Acquired Illness or Injury  Outcome: Progressing  Intervention: Prevent Skin Injury  Recent Flowsheet Documentation  Taken 7/27/2024 1053 by Yolanda Martinez, RN  Body Position: position changed independently  Intervention: Prevent and Manage VTE (Venous Thromboembolism) Risk  Recent Flowsheet Documentation  Taken 7/27/2024 1053 by Yolanda Martinez, RN  VTE Prevention/Management: " SCDs off (sequential compression devices)  Intervention: Prevent Infection  Recent Flowsheet Documentation  Taken 2024 1053 by Yolanda Martinez RN  Infection Prevention:   rest/sleep promoted   hand hygiene promoted  Goal: Optimal Comfort and Wellbeing  Outcome: Progressing  Intervention: Monitor Pain and Promote Comfort  Recent Flowsheet Documentation  Taken 2024 1053 by Yolanda Martinez RN  Pain Management Interventions:   medication (see MAR)   pillow support provided   shower  Intervention: Provide Person-Centered Care  Recent Flowsheet Documentation  Taken 2024 1053 by Yolanda Martinez RN  Trust Relationship/Rapport:   choices provided   care explained   questions answered   questions encouraged   reassurance provided   thoughts/feelings acknowledged  Goal: Readiness for Transition of Care  Outcome: Progressing     Problem: Postpartum ( Delivery)  Goal: Successful Parent Role Transition  Outcome: Progressing  Intervention: Support Parent Role Transition  Recent Flowsheet Documentation  Taken 2024 1053 by Yolanda Martinez RN  Supportive Measures:   active listening utilized   decision-making supported   goal-setting facilitated   self-care encouraged   self-responsibility promoted   verbalization of feelings encouraged  Parent-Child Attachment Promotion:   caring behavior modeled   interaction encouraged   strengths emphasized   positive reinforcement provided   participation in care promoted   parent/caregiver presence encouraged   cue recognition promoted  Goal: Hemostasis  Outcome: Progressing  Goal: Effective Bowel Elimination  Outcome: Progressing  Intervention: Enhance Bowel Motility and Elimination  Recent Flowsheet Documentation  Taken 2024 1053 by Yolanda Martinez RN  Bowel Motility Enhancement:   ambulation promoted   fluid intake encouraged  Bowel Elimination Promotion:   adequate fluid intake promoted   ambulation  promoted  Goal: Fluid and Electrolyte Balance  Outcome: Progressing  Goal: Absence of Infection Signs and Symptoms  Outcome: Progressing  Goal: Anesthesia/Sedation Recovery  Outcome: Progressing  Goal: Optimal Pain Control and Function  Outcome: Progressing  Intervention: Prevent or Manage Pain  Recent Flowsheet Documentation  Taken 7/27/2024 1053 by Yolanda Martinez, RN  Pain Management Interventions:   medication (see MAR)   pillow support provided   shower  Perineal Care: absorbent brief/pad changed  Goal: Nausea and Vomiting Relief  Outcome: Progressing  Goal: Effective Urinary Elimination  Outcome: Progressing  Intervention: Monitor and Manage Urinary Retention  Recent Flowsheet Documentation  Taken 7/27/2024 1053 by Yolanda Martinez, RN  Urinary Elimination Promotion: frequent voiding encouraged  Goal: Effective Oxygenation and Ventilation  Outcome: Progressing  Intervention: Optimize Oxygenation and Ventilation  Recent Flowsheet Documentation  Taken 7/27/2024 1053 by Yolanda Martinez, RN  Head of Bed (HOB) Positioning: HOB at 60-90 degrees

## 2024-07-27 NOTE — PLAN OF CARE
"Goal Outcome Evaluation:      Plan of Care Reviewed With: patient, spouse    Overall Patient Progress: improvingOverall Patient Progress: improving     Up ad  eduarda. Voiding without difficulty. Taking Tylenol and ibuprofen for pain .  Encouraged increase ambulation . FOB here, supportive.      Problem: Adult Inpatient Plan of Care  Goal: Plan of Care Review  Description: The Plan of Care Review/Shift note should be completed every shift.  The Outcome Evaluation is a brief statement about your assessment that the patient is improving, declining, or no change.  This information will be displayed automatically on your shift  note.  Outcome: Progressing  Flowsheets (Taken 7/26/2024 1948)  Plan of Care Reviewed With:   patient   spouse  Overall Patient Progress: improving  Goal: Patient-Specific Goal (Individualized)  Description: You can add care plan individualizations to a care plan. Examples of Individualization might be:  \"Parent requests to be called daily at 9am for status\", \"I have a hard time hearing out of my right ear\", or \"Do not touch me to wake me up as it startles  me\".  Outcome: Progressing  Goal: Absence of Hospital-Acquired Illness or Injury  Outcome: Progressing  Intervention: Prevent Skin Injury  Recent Flowsheet Documentation  Taken 7/26/2024 1638 by Laly Bautista RN  Body Position: position changed independently  Intervention: Prevent and Manage VTE (Venous Thromboembolism) Risk  Recent Flowsheet Documentation  Taken 7/26/2024 1638 by Laly Bautista RN  VTE Prevention/Management: SCDs off (sequential compression devices)  Goal: Optimal Comfort and Wellbeing  Outcome: Progressing  Intervention: Monitor Pain and Promote Comfort  Recent Flowsheet Documentation  Taken 7/26/2024 1638 by Laly Bautista RN  Pain Management Interventions: medication (see MAR)  Intervention: Provide Person-Centered Care  Recent Flowsheet Documentation  Taken 7/26/2024 1638 by Laly Bautista RN  Trust " Relationship/Rapport: care explained  Goal: Readiness for Transition of Care  Outcome: Progressing     Problem: Postpartum ( Delivery)  Goal: Successful Parent Role Transition  Outcome: Progressing  Intervention: Support Parent Role Transition  Recent Flowsheet Documentation  Taken 2024 1638 by Laly Bautista RN  Supportive Measures: active listening utilized  Parent-Child Attachment Promotion: caring behavior modeled  Goal: Hemostasis  Outcome: Progressing  Goal: Effective Bowel Elimination  Outcome: Progressing  Intervention: Enhance Bowel Motility and Elimination  Recent Flowsheet Documentation  Taken 2024 1638 by Laly Bautista RN  Bowel Elimination Promotion: adequate fluid intake promoted  Goal: Fluid and Electrolyte Balance  Outcome: Progressing  Goal: Absence of Infection Signs and Symptoms  Outcome: Progressing  Goal: Anesthesia/Sedation Recovery  Outcome: Progressing  Goal: Optimal Pain Control and Function  Outcome: Progressing  Intervention: Prevent or Manage Pain  Recent Flowsheet Documentation  Taken 2024 1638 by Laly Bautista RN  Pain Management Interventions: medication (see MAR)  Perineal Care: absorbent brief/pad changed  Goal: Nausea and Vomiting Relief  Outcome: Progressing  Goal: Effective Urinary Elimination  Outcome: Progressing  Goal: Effective Oxygenation and Ventilation  Outcome: Progressing  Intervention: Optimize Oxygenation and Ventilation  Recent Flowsheet Documentation  Taken 2024 1638 by Laly Bautista RN  Head of Bed (HOB) Positioning: HOB at 60-90 degrees

## 2024-07-27 NOTE — PROGRESS NOTES
[facility] Obstetrics Post-Op / Progress Note    Interval History   Doing well.  Pain is well-controlled.  No fevers.  No history of wound drainage, warmth or significant erythema.  Good appetite.  Denies chest pain, shortness of breath, nausea or vomiting.  Ambulatory.  Breastfeeding well.    Medications   Current Facility-Administered Medications   Medication Dose Route Frequency Provider Last Rate Last Admin    dextrose 5% in lactated ringers infusion   Intravenous Continuous Katherine Bean  mL/hr at 07/26/24 0325 New Bag at 07/26/24 0325    No MMR Needed -  Assessment: Patient does not need MMR vaccine   Does not apply Continuous PRN Katherine Bean MD        No Tdap Needed - Assessment: Patient does not need Tdap vaccine   Does not apply Continuous PRN Katherine Bean MD        oxytocin (PITOCIN) 30 units in 500 mL 0.9% NaCl infusion  100-340 mL/hr Intravenous Continuous PRN Katherine Bean MD        oxytocin (PITOCIN) 30 units in 500 mL 0.9% NaCl infusion  340 mL/hr Intravenous Continuous PRN Katherine Bean MD         Current Facility-Administered Medications   Medication Dose Route Frequency Provider Last Rate Last Admin    acetaminophen (TYLENOL) tablet 975 mg  975 mg Oral Q6H Katherine Bean MD   975 mg at 07/27/24 0425    enoxaparin ANTICOAGULANT (LOVENOX) injection 40 mg  40 mg Subcutaneous Q24H Katherine Bean MD   40 mg at 07/26/24 1417    escitalopram (LEXAPRO) tablet 20 mg  20 mg Oral At Bedtime Katherine Bean MD   20 mg at 07/26/24 2133    ibuprofen (ADVIL/MOTRIN) tablet 800 mg  800 mg Oral Q6H Katherine Bean MD   800 mg at 07/27/24 0425    senna-docusate (SENOKOT-S/PERICOLACE) 8.6-50 MG per tablet 1 tablet  1 tablet Oral BID Katherine Bean MD   1 tablet at 07/26/24 0853    Or    senna-docusate (SENOKOT-S/PERICOLACE) 8.6-50 MG per tablet 2 tablet  2 tablet Oral BID Katherine Bean MD   2 tablet at 07/26/24 2132    sodium chloride (PF) 0.9% PF flush 3 mL  3 mL  Intracatheter Q8H Katherine Bean MD   3 mL at 24 1638       Physical Exam   Temp: 97.8  F (36.6  C) Temp src: Oral BP: 91/67 Pulse: 65   Resp: 18 SpO2: 100 % O2 Device: None (Room air)    Vitals:    24 1854   Weight: 97 kg (213 lb 13.5 oz)     Vital Signs with Ranges  Temp:  [97.8  F (36.6  C)-99  F (37.2  C)] 97.8  F (36.6  C)  Pulse:  [65-78] 65  Resp:  [16-18] 18  BP: (91-99)/(61-67) 91/67  SpO2:  [100 %] 100 %  I/O last 3 completed shifts:  In: 1046 [I.V.:1046]  Out: 900 [Urine:900]    Uterine fundus is firm, non-tender and at the level of the umbilicus  Incision C/D/I    Data   Recent Labs   Lab Test 24   AS Negative     Recent Labs   Lab Test 24  0730 24   HGB 10.3* 12.1     No lab results found.    Assessment & Plan   -2 Days Post-Op Procedure(s):   section    Normal healing wound.  No excessive bleeding  Pain well-controlled.  Anticipate discharge today or tomorrow    -hemodynamically stable             - ABLA, asymptomatic             - continue prenatal vitamins             - iron rich diet and vit C  -Rh pos  -Diet regular  -Pain control with Tylenol, Motrin and Oxycodone  -DVT ppx - SCDs while on bed and ambulation  -bowel ppx- Senna/docusate, simethicone  -Breast feeding, encouraged, continue PNV's, proper hydration and caloric intake counseled.  -Tdap and flu given prenatally  -Rubella immune  -BC; IUD     BMI 37  - ppx for VTE with lovenox during admission    Char Herron MD

## 2024-07-27 NOTE — PLAN OF CARE
Vital signs stable. Postpartum assessment WDL. Uterine fundus is firm and midline. Scant vaginal bleeding. Using Tylenol and Ibuprofen for pain with good relief. Incision unable to visualize. Up and ambulating; free of dizziness. Voiding w/o difficulty. Tolerating a regular diet. Breastfeeding well. Will continue to monitor. Questions/concerns addressed.

## 2024-07-27 NOTE — LACTATION NOTE
Lactation follow up with Eleanor and baby Sarahy. Baby due for a feed at time of visit. Baby does some tongue thrusting and keeps tongue at back of mouth. Continues to use shield. Feed at 0700 baby latched in football on both side. Swallows heard. Hand expressed post feed and finger fed back to baby.     1106 in for a feed on left. Baby eager and latched right away and moved to a nutritive suck pattern with swallows. Encouraged to hane express and give any ebm back to baby. Reminded on cleaning and care of shield. Eleanor feeling more breast fullness today. Will continue to call for assistance.

## 2024-07-27 NOTE — PLAN OF CARE
"Goal Outcome Evaluation:      Plan of Care Reviewed With: patient, spouse    Overall Patient Progress: improvingOverall Patient Progress: improving    Outcome Evaluation: Up ad eduarda, Voiding without difficulty . Taking Ibuprofen and tylenol for mild incisional  pain . + passing flatus, + BM .      Problem: Adult Inpatient Plan of Care  Goal: Plan of Care Review  Description: The Plan of Care Review/Shift note should be completed every shift.  The Outcome Evaluation is a brief statement about your assessment that the patient is improving, declining, or no change.  This information will be displayed automatically on your shift  note.  Outcome: Progressing  Flowsheets (Taken 7/27/2024 1648)  Outcome Evaluation: Up ad eduarda, Voiding without difficulty . Taking Ibuprofen and tylenol for mild incisional  pain . + passing flatus, + BM .  Plan of Care Reviewed With:   patient   spouse  Overall Patient Progress: improving  Goal: Patient-Specific Goal (Individualized)  Description: You can add care plan individualizations to a care plan. Examples of Individualization might be:  \"Parent requests to be called daily at 9am for status\", \"I have a hard time hearing out of my right ear\", or \"Do not touch me to wake me up as it startles  me\".  Outcome: Progressing  Goal: Absence of Hospital-Acquired Illness or Injury  Outcome: Progressing  Intervention: Prevent Skin Injury  Recent Flowsheet Documentation  Taken 7/27/2024 1550 by Laly Bautista RN  Body Position: position changed independently  Intervention: Prevent and Manage VTE (Venous Thromboembolism) Risk  Recent Flowsheet Documentation  Taken 7/27/2024 1600 by Laly Bautista, RN  VTE Prevention/Management: SCDs off (sequential compression devices)  Goal: Optimal Comfort and Wellbeing  Outcome: Progressing  Intervention: Monitor Pain and Promote Comfort  Recent Flowsheet Documentation  Taken 7/27/2024 1639 by Laly Bautista RN  Pain Management Interventions: medication " (see MAR)  Taken 2024 1550 by Laly Bautista RN  Pain Management Interventions: declines  Intervention: Provide Person-Centered Care  Recent Flowsheet Documentation  Taken 2024 1600 by Laly Bautista RN  Trust Relationship/Rapport: care explained  Goal: Readiness for Transition of Care  Outcome: Progressing     Problem: Postpartum ( Delivery)  Goal: Successful Parent Role Transition  Outcome: Progressing  Intervention: Support Parent Role Transition  Recent Flowsheet Documentation  Taken 2024 1600 by Laly Bautista RN  Supportive Measures: active listening utilized  Parent-Child Attachment Promotion: caring behavior modeled  Goal: Hemostasis  Outcome: Progressing  Goal: Effective Bowel Elimination  Outcome: Progressing  Intervention: Enhance Bowel Motility and Elimination  Recent Flowsheet Documentation  Taken 2024 1600 by Laly Bautista RN  Bowel Motility Enhancement: ambulation promoted  Bowel Elimination Promotion: adequate fluid intake promoted  Goal: Fluid and Electrolyte Balance  Outcome: Progressing  Goal: Absence of Infection Signs and Symptoms  Outcome: Progressing  Goal: Anesthesia/Sedation Recovery  Outcome: Progressing  Goal: Optimal Pain Control and Function  Outcome: Progressing  Intervention: Prevent or Manage Pain  Recent Flowsheet Documentation  Taken 2024 1639 by Laly Bautista RN  Pain Management Interventions: medication (see MAR)  Taken 2024 1550 by Laly Bautista RN  Pain Management Interventions: declines  Perineal Care: absorbent brief/pad changed  Goal: Nausea and Vomiting Relief  Outcome: Progressing  Goal: Effective Urinary Elimination  Outcome: Progressing  Goal: Effective Oxygenation and Ventilation  Outcome: Progressing  Intervention: Optimize Oxygenation and Ventilation  Recent Flowsheet Documentation  Taken 2024 1550 by Laly Bautista RN  Head of Bed (HOB) Positioning: HOB at 60-90 degrees

## 2024-07-28 VITALS
DIASTOLIC BLOOD PRESSURE: 82 MMHG | BODY MASS INDEX: 35.78 KG/M2 | HEART RATE: 62 BPM | OXYGEN SATURATION: 100 % | WEIGHT: 215 LBS | SYSTOLIC BLOOD PRESSURE: 115 MMHG | TEMPERATURE: 98.1 F | RESPIRATION RATE: 18 BRPM

## 2024-07-28 PROCEDURE — 999N000079 HC STATISTIC IP LACTATION SERVICES 1-15 MIN

## 2024-07-28 PROCEDURE — 250N000013 HC RX MED GY IP 250 OP 250 PS 637: Performed by: OBSTETRICS & GYNECOLOGY

## 2024-07-28 RX ADMIN — IBUPROFEN 800 MG: 800 TABLET, FILM COATED ORAL at 05:00

## 2024-07-28 RX ADMIN — ACETAMINOPHEN 975 MG: 325 TABLET, FILM COATED ORAL at 05:00

## 2024-07-28 RX ADMIN — ACETAMINOPHEN 975 MG: 325 TABLET, FILM COATED ORAL at 10:31

## 2024-07-28 RX ADMIN — IBUPROFEN 800 MG: 800 TABLET, FILM COATED ORAL at 10:31

## 2024-07-28 RX ADMIN — SENNOSIDES AND DOCUSATE SODIUM 1 TABLET: 8.6; 5 TABLET ORAL at 10:31

## 2024-07-28 ASSESSMENT — ACTIVITIES OF DAILY LIVING (ADL)
ADLS_ACUITY_SCORE: 20

## 2024-07-28 NOTE — LACTATION NOTE
Lactation in to visit before discharge. Patient states infant nursing well with swallows heard.  Did hand express and used her Mom cozy pump over night and gave back small volumes back to baby. Encouraged pumping to give back if baby not active and swallowing at breast. Patient states she nursed without nipple shield, knows to place on if baby struggling to get a deep latch. Knows to call for assistance prn

## 2024-07-28 NOTE — PROGRESS NOTES
[facility] Obstetrics Post-Op / Progress Note    Interval History   Doing well.  Pain is well-controlled.  No fevers.  No history of wound drainage, warmth or significant erythema.  Good appetite.  Denies chest pain, shortness of breath, nausea or vomiting.  Ambulatory.  Breastfeeding well.    Medications   Current Facility-Administered Medications   Medication Dose Route Frequency Provider Last Rate Last Admin    dextrose 5% in lactated ringers infusion   Intravenous Continuous Katherine Bean  mL/hr at 07/26/24 0325 New Bag at 07/26/24 0325    No MMR Needed -  Assessment: Patient does not need MMR vaccine   Does not apply Continuous PRN Katherine Bean MD        No Tdap Needed - Assessment: Patient does not need Tdap vaccine   Does not apply Continuous PRN Katherine Bean MD        oxytocin (PITOCIN) 30 units in 500 mL 0.9% NaCl infusion  100-340 mL/hr Intravenous Continuous PRN Katherine Bean MD        oxytocin (PITOCIN) 30 units in 500 mL 0.9% NaCl infusion  340 mL/hr Intravenous Continuous PRN Katherine Bean MD         Current Facility-Administered Medications   Medication Dose Route Frequency Provider Last Rate Last Admin    acetaminophen (TYLENOL) tablet 975 mg  975 mg Oral Q6H Katherine Bena MD   975 mg at 07/28/24 0500    enoxaparin ANTICOAGULANT (LOVENOX) injection 40 mg  40 mg Subcutaneous Q24H Katherine Bean MD   40 mg at 07/27/24 1414    escitalopram (LEXAPRO) tablet 20 mg  20 mg Oral At Bedtime Katherine Bean MD   20 mg at 07/27/24 2231    ibuprofen (ADVIL/MOTRIN) tablet 800 mg  800 mg Oral Q6H Katherine Bean MD   800 mg at 07/28/24 0500    senna-docusate (SENOKOT-S/PERICOLACE) 8.6-50 MG per tablet 1 tablet  1 tablet Oral BID Katherine Bean MD   1 tablet at 07/27/24 1947    Or    senna-docusate (SENOKOT-S/PERICOLACE) 8.6-50 MG per tablet 2 tablet  2 tablet Oral BID Katherine Bean MD   2 tablet at 07/27/24 1049       Physical Exam   Temp: 98.4  F (36.9  C) Temp src:  Oral BP: 109/71 Pulse: 85   Resp: 16   O2 Device: None (Room air)    Vitals:    24   Weight: 97 kg (213 lb 13.5 oz)     Vital Signs with Ranges  Temp:  [97.7  F (36.5  C)-98.4  F (36.9  C)] 98.4  F (36.9  C)  Pulse:  [75-85] 85  Resp:  [16-18] 16  BP: (101-115)/(62-71) 109/71  No intake/output data recorded.    Uterine fundus is firm, non-tender and at the level of the umbilicus  Incision C/D/I    Data   Recent Labs   Lab Test 24   AS Negative     Recent Labs   Lab Test 24   HGB 10.3* 12.1     No lab results found.    Assessment & Plan   -3 Days Post-Op Procedure(s):   section    Doing well.  Normal healing wound.  Pain well-controlled.  Discharge later today    -hemodynamically stable             - ABLA, asymptomatic             - continue prenatal vitamins             - iron rich diet and vit C  -Rh pos  -Diet regular  -Pain control with Tylenol, Motrin and Oxycodone  -DVT ppx - SCDs while on bed and ambulation  -bowel ppx- Senna/docusate, simethicone  -Breast feeding, encouraged, continue PNV's, proper hydration and caloric intake counseled.  -Tdap and flu given prenatally  -Rubella immune  -BC; IUD     BMI 37  - ppx for VTE with lovenox during admission    Char Herron MD

## 2024-07-28 NOTE — DISCHARGE INSTRUCTIONS
Discharge diet: Regular   Discharge activity: No lifting greater than 15 lbs, pushing, pulling, or other strenuous activity for 6 weeks. Pelvic rest for 6 weeks including no sexual intercourse, tampons, or douching. No driving until you can slam on the breaks without pain or while on narcotic pain medications.    Discharge follow-up: Follow up with primary OB for routine postpartum visit in 6 weeks      Wound care: Keep incision clean and dry         Warning Signs after Having a Baby    Keep this paper on your fridge or somewhere else where you can see it.    Call your provider if you have any of these symptoms up to 12 weeks after having your baby.    Thoughts of hurting yourself or your baby  Pain in your chest or trouble breathing  Severe headache not helped by pain medicine  Eyesight concerns (blurry vision, seeing spots or flashes of light, other changes to eyesight)  Fainting, shaking or other signs of a seizure    Call 9-1-1 if you feel that it is an emergency.     The symptoms below can happen to anyone after giving birth. They can be very serious. Call your provider if you have any of these warning signs.    My provider s phone number: _______________________    Losing too much blood (hemorrhage)    Call your provider if you soak through a pad in less than an hour or pass blood clots bigger than a golf ball. These may be signs that you are bleeding too much.    Blood clots in the legs or lungs    After you give birth, your body naturally clots its blood to help prevent blood loss. Sometimes this increased clotting can happen in other areas of the body, like the legs or lungs. This can block your blood flow and be very dangerous.     Call your provider if you:  Have a red, swollen spot on the back of your leg that is warm or painful when you touch it.   Are coughing up blood.     Infection    Call your provider if you have any of these symptoms:  Fever of 100.4 F (38 C) or higher.  Pain or redness around  your stitches if you had an incision.   Any yellow, white, or green fluid coming from places where you had stitches or surgery.    Mood Problems (postpartum depression)    Many people feel sad or have mood changes after having a baby. But for some people, these mood swings are worse.     Call your provider right away if you feel so anxious or nervous that you can't care for yourself or your baby.    Preeclampsia (high blood pressure)    Even if you didn't have high blood pressure when you were pregnant, you are at risk for the high blood pressure disease called preeclampsia. This risk can last up to 12 weeks after giving birth.     Call your provider if you have:   Pain on your right side under your rib cage  Sudden swelling in the hands and face    Remember: You know your body. If something doesn't feel right, get medical help.     For informational purposes only. Not to replace the advice of your health care provider. Copyright 2020 [address] [facility]. All rights reserved. Clinically reviewed by Alina Gonzales, RNC-OB, MSN. [employer] [id number] - Rev .       Section: What to Expect at Home  Your Recovery     A  section, or , is surgery to deliver your baby through a cut that the doctor makes in your lower belly and uterus. The cut is called an incision.  You may have some pain in your lower belly and need pain medicine for 1 to 2 weeks. You can expect some vaginal bleeding for several weeks. You will probably need about 6 weeks to fully recover.  It's important to take it easy while the incision heals. Avoid heavy lifting, strenuous activities, and exercises that strain the belly muscles while you recover. Ask a family member or friend for help with housework, cooking, and shopping.  This care sheet gives you a general idea about how long it will take for you to recover. But each person recovers at a different pace. Follow the steps below to get better as quickly as possible.  How  can you care for yourself at home?  Activity    Rest when you feel tired. Getting enough sleep will help you recover.     Try to walk each day. Start by walking a little more than you did the day before. Bit by bit, increase the amount you walk. Walking boosts blood flow and helps prevent pneumonia, constipation, and blood clots.     Avoid strenuous activities, such as bicycle riding, jogging, weightlifting, and aerobic exercise, for 6 weeks or until your doctor says it is okay.     Until your doctor says it is okay, do not lift anything heavier than your baby.     Do not do sit-ups or other exercises that strain the belly muscles for 6 weeks or until your doctor says it is okay.     Hold a pillow over your incision when you cough or take deep breaths. This will support your belly and decrease your pain.     You may shower as usual. Pat the incision dry when you are done.     You will have some vaginal bleeding. Wear sanitary pads. Do not douche or use tampons until your doctor says it is okay.     Ask your doctor when you can drive again.     You will probably need to take at least 6 weeks off work. It depends on the type of work you do and how you feel.     Ask your doctor when it is okay for you to have sex.   Diet    You can eat your normal diet. If your stomach is upset, try bland, low-fat foods like plain rice, broiled chicken, toast, and yogurt.     Drink plenty of fluids (unless your doctor tells you not to).     You may notice that your bowel movements are not regular right after your surgery. This is common. Try to avoid constipation and straining with bowel movements. You may want to take a fiber supplement every day. If you have not had a bowel movement after a couple of days, ask your doctor about taking a mild laxative.     If you are breastfeeding, limit alcohol. Alcohol can cause a lack of energy and other health problems for the baby when a breastfeeding woman drinks heavily. It can also get in the  way of a mom's ability to feed her baby or to care for the child in other ways. There isn't a lot of research about exactly how much alcohol can harm a baby. Having no alcohol is the safest choice for your baby. If you choose to have a drink now and then, have only one drink, and limit the number of occasions that you have a drink. Wait to breastfeed at least 2 hours after you have a drink to reduce the amount of alcohol the baby may get in the milk.   Medicines    Your doctor will tell you if and when you can restart your medicines. You will also get instructions about taking any new medicines.     If you stopped taking aspirin or some other blood thinner, your doctor will tell you when to start taking it again.     Take pain medicines exactly as directed.  If the doctor gave you a prescription medicine for pain, take it as prescribed.  If you are not taking a prescription pain medicine, ask your doctor if you can take an over-the-counter medicine.     If you think your pain medicine is making you sick to your stomach:  Take your medicine after meals (unless your doctor has told you not to).  Ask your doctor for a different pain medicine.     If your doctor prescribed antibiotics, take them as directed. Do not stop taking them just because you feel better. You need to take the full course of antibiotics.   Incision care    If you have strips of tape on the incision, leave the tape on for a week or until it falls off.     Wash the area daily with warm, soapy water, and pat it dry. Don't use hydrogen peroxide or alcohol, which can slow healing. You may cover the area with a gauze bandage if it weeps or rubs against clothing. Change the bandage every day.     Keep the area clean and dry.   Other instructions    If you breastfeed your baby, you may be more comfortable while you are healing if you don't rest your baby on your belly. Try tucking your baby under your arm, with your baby's body along the side you will be  feeding on. Support your baby's upper body with your arm. With that hand you can control your baby's head to bring your baby's mouth to your breast. This is sometimes called the football hold.   Follow-up care is a key part of your treatment and safety. Be sure to make and go to all appointments, and call your doctor if you are having problems. It's also a good idea to know your test results and keep a list of the medicines you take.  When should you call for help?  Share this information with your partner, family, or a friend. They can help you watch for warning signs.  Call 911  anytime you think you may need emergency care. For example, call if:    You feel you cannot stop from hurting yourself, your baby, or someone else.     You passed out (lost consciousness).     You have chest pain, are short of breath, or cough up blood.     You have a seizure.   Where to get help 24 hours a day, 7 days a week   If you or someone you know talks about suicide, self-harm, a mental health crisis, a substance use crisis, or any other kind of emotional distress, get help right away. You can:    Call the Suicide and Crisis Lifeline at 978.     Call 1-833-919-TALK (1-695.970.7679).     Text HOME to 251779 to access the Crisis Text Line.   Consider saving these numbers in your phone.  Go to [employer] for more information or to chat online.  Call your doctor or midwife now or seek immediate medical care if:    You have loose stitches, or your incision comes open.     You have signs of hemorrhage (too much bleeding), such as:  Heavy vaginal bleeding. This means that you are soaking through one or more pads in an hour. Or you pass blood clots bigger than an egg.  Feeling dizzy or lightheaded, or you feel like you may faint.  Feeling so tired or weak that you cannot do your usual activities.  A fast or irregular heartbeat.  New or worse belly pain.     You have symptoms of infection, such as:  Increased pain, swelling, warmth, or  "redness.  Red streaks leading from the incision.  Pus draining from the incision.  A fever.  Frequent or painful urination or blood in your urine.  Vaginal discharge that smells bad.  New or worse belly pain.     You have symptoms of a blood clot in your leg (called a deep vein thrombosis), such as:  Pain in the calf, back of the knee, thigh, or groin.  Swelling in the leg or groin.  A color change on the leg or groin. The skin may be reddish or purplish, depending on your usual skin color.     You have signs of preeclampsia, such as:  Sudden swelling of your face, hands, or feet.  New vision problems (such as dimness, blurring, or seeing spots).  A severe headache.     You have signs of heart failure, such as:  New or increased shortness of breath.  New or worse swelling in your legs, ankles, or feet.  Sudden weight gain, such as more than 2 to 3 pounds in a day or 5 pounds in a week.  Feeling so tired or weak that you cannot do your usual activities.     You had spinal or epidural pain relief and have:  New or worse back pain.  Increased pain, swelling, warmth, or redness at the injection site.  Tingling, weakness, or numbness in your legs or groin.   Watch closely for changes in your health, and be sure to contact your doctor or midwife if:    Your vaginal bleeding isn't decreasing.     You feel sad, anxious, or hopeless for more than a few days.     You are having problems with your breasts or breastfeeding.   Where can you learn more?  Go to https://www.[employer].net/patiented  Enter M806 in the search box to learn more about \" Section: What to Expect at Home.\"  Current as of: July 10, 2023               Content Version: 14.0    5771-2654 [employer].   Care instructions adapted under license by your healthcare professional. If you have questions about a medical condition or this instruction, always ask your healthcare professional. [employer] disclaims any warranty or " liability for your use of this information.

## 2024-07-28 NOTE — PLAN OF CARE
Goal Outcome Evaluation: All goals met and reviewed with patient.    All discharge instructions were reviewed with patient by writer and all questions answered. Patient is aware to follow up in 6 weeks for postpartum visit or sooner for any concerns.  All discharge medications were issued to patient and reviewed how/when to take and discussed proper disposal of narcotics. Copy of PPD issued and discussed s/sx and possible need for an increase in her antidepressant if having mood changes. Patient has a therapist that she plans to talk to and has good spouse and family support. Has breast pump at home. Patient left unit with infant and all belongings at 1450.

## 2024-07-28 NOTE — PLAN OF CARE
Vital signs WNL. Breastfeeding  and using shield for assistance with latch. Latch assistance provided in cross-cradle position. Encouraged use of breast compressions and reviewed latch technique. Tolerating a regular diet, voiding without difficulty and ambulating in room ad eduarda. Incision is free of s/s of infection. Postpartum bleeding and fundal check WNL. Pain managed with Tylenol and Ibuprofen overnight. Bonding well with , anticipated discharge today.       Problem: Postpartum ( Delivery)  Goal: Successful Parent Role Transition  Outcome: Progressing     Problem: Postpartum ( Delivery)  Goal: Hemostasis  Outcome: Progressing     Problem: Postpartum ( Delivery)  Goal: Absence of Infection Signs and Symptoms  Outcome: Progressing     Problem: Postpartum ( Delivery)  Goal: Optimal Pain Control and Function  Outcome: Progressing  Intervention: Prevent or Manage Pain  Recent Flowsheet Documentation  Taken 2024 0500 by Milagro Ramirez, RN  Pain Management Interventions: medication (see MAR)  Taken 2024 2330 by Milagro Ramirez, RN  Pain Management Interventions: pain management plan reviewed with patient/caregiver     Problem: Postpartum ( Delivery)  Goal: Effective Oxygenation and Ventilation  Outcome: Progressing  Intervention: Optimize Oxygenation and Ventilation  Recent Flowsheet Documentation  Taken 2024 2330 by Milagro Ramirez, RN  Head of Bed (HOB) Positioning: HOB at 45 degrees

## 2024-07-30 ENCOUNTER — PATIENT OUTREACH (OUTPATIENT)
Dept: CARE COORDINATION | Facility: CLINIC | Age: 29
End: 2024-07-30
Payer: COMMERCIAL

## 2024-07-30 NOTE — PROGRESS NOTES
"Clinic Care Coordination Contact  Transitions of Care Outreach  Chief Complaint   Patient presents with    Clinic Care Coordination - Post Hospital       Most Recent Admission Date: 2024   Most Recent Admission Diagnosis:      Most Recent Discharge Date: 2024   Most Recent Discharge Diagnosis: S/P  section - Z98.891     Transitions of Care Assessment    Discharge Assessment  How are you doing now that you are home?: \" Everything is going great \"  How are your symptoms? (Red Flag symptoms escalate to triage hotline per guidelines): Improved  Do you know how to contact your clinic care team if you have future questions or changes to your health status? : Yes  Does the patient have their discharge instructions? : Yes  Does the patient have questions regarding their discharge instructions? : No  Were you started on any new medications or were there changes to any of your previous medications? : Yes  Does the patient have all of their medications?: Yes  Do you have questions regarding any of your medications? : No  Do you have all of your needed medical supplies or equipment (DME)?  (i.e. oxygen tank, CPAP, cane, etc.): Yes    Post-op ([facility] Only)  If the patient had a surgery or procedure, do they have any questions for a nurse?: No         [facility] Explained and offered Care Coordination support to eligible patients: Yes    Patient accepted? No    Follow up Plan     Discharge Follow-Up  Discharge follow up appointment scheduled in alignment with recommended follow up timeframe or Transitions of Risk Category? (Low = within 30 days; Moderate= within 14 days; High= within 7 days): Yes  Discharge Follow Up Appointment Date: 24  Discharge Follow Up Appointment Scheduled with?: Specialty Care Provider    No future appointments.    Outpatient Plan as outlined on AVS reviewed with patient.    For any urgent concerns, please contact our 24 hour nurse triage line: [phone] ([phone])   "     Beryl Del Castillo MA

## (undated) DEVICE — SU MONOCRYL 3-0 KS 27" UND Y523H

## (undated) DEVICE — LINEN TOWEL PACK X10 5473

## (undated) DEVICE — SU VICRYL 0 CT 36" J358H

## (undated) DEVICE — SOL NACL 0.9% IRRIG 1000ML BOTTLE 2F7124

## (undated) DEVICE — CAP BABY PINK/BLUE IC-2

## (undated) DEVICE — PACK C-SECTION LF PL15OTA83B

## (undated) DEVICE — LINEN FULL SHEET 5511

## (undated) DEVICE — DRSG ABDOMINAL 07 1/2X8" 7197D

## (undated) DEVICE — LINEN DRAPE 54X72" 5467

## (undated) DEVICE — SURGICEL POWDER ABSORBABLE HEMOSTAT 3GM 3013SP

## (undated) DEVICE — SU MONOCRYL 0 CT 36" Y358H

## (undated) DEVICE — GLOVE BIOGEL PI MICRO INDICATOR UNDERGLOVE SZ 6.0 48960

## (undated) DEVICE — SU PLAIN 3-0 SH 27" G322H

## (undated) DEVICE — BLADE CLIPPER SGL USE 9680

## (undated) DEVICE — DRSG STERI STRIP 1/2X4" R1547

## (undated) DEVICE — GLOVE BIOGEL PI MICRO SZ 6.0 48560

## (undated) DEVICE — GLOVE BIOGEL PI ULTRATOUCH SZ 6.0 41160

## (undated) DEVICE — Device

## (undated) DEVICE — ESU GROUND PAD ADULT W/CORD E7507

## (undated) DEVICE — SUCTION MITY VAC MUSHROOM CUP 10007LP

## (undated) DEVICE — STOCKING SLEEVE VASOPRESS COMPRESSION CALF MED VP501M

## (undated) DEVICE — SYR TRANSFER DEVICE BLOOD NDL HOLDER 3648800

## (undated) DEVICE — LINEN HALF SHEET 5512

## (undated) DEVICE — PREP CHLORAPREP 26ML TINTED HI-LITE ORANGE 930815

## (undated) DEVICE — SUCTION KIWI VAC  VAC-6000M

## (undated) DEVICE — CATH TRAY FOLEY 16FR SILICONE 907416

## (undated) DEVICE — BAG CLEAR TRASH 1.3M 39X33" P4040C

## (undated) DEVICE — SU VICRYL 3-0 KS 27" UND J663H

## (undated) DEVICE — SOL WATER IRRIG 1000ML BOTTLE 2F7114

## (undated) RX ORDER — DEXAMETHASONE SODIUM PHOSPHATE 4 MG/ML
INJECTION, SOLUTION INTRA-ARTICULAR; INTRALESIONAL; INTRAMUSCULAR; INTRAVENOUS; SOFT TISSUE
Status: DISPENSED
Start: 2024-07-25

## (undated) RX ORDER — KETOROLAC TROMETHAMINE 30 MG/ML
INJECTION, SOLUTION INTRAMUSCULAR; INTRAVENOUS
Status: DISPENSED
Start: 2024-07-25

## (undated) RX ORDER — OXYTOCIN/0.9 % SODIUM CHLORIDE 30/500 ML
PLASTIC BAG, INJECTION (ML) INTRAVENOUS
Status: DISPENSED
Start: 2024-07-25

## (undated) RX ORDER — MORPHINE SULFATE 1 MG/ML
INJECTION, SOLUTION EPIDURAL; INTRATHECAL; INTRAVENOUS
Status: DISPENSED
Start: 2024-07-25

## (undated) RX ORDER — TRANEXAMIC ACID 100 MG/ML
INJECTION, SOLUTION INTRAVENOUS
Status: DISPENSED
Start: 2024-07-25

## (undated) RX ORDER — FENTANYL CITRATE 50 UG/ML
INJECTION, SOLUTION INTRAMUSCULAR; INTRAVENOUS
Status: DISPENSED
Start: 2024-07-25